# Patient Record
Sex: FEMALE | Race: WHITE | Employment: FULL TIME | URBAN - METROPOLITAN AREA
[De-identification: names, ages, dates, MRNs, and addresses within clinical notes are randomized per-mention and may not be internally consistent; named-entity substitution may affect disease eponyms.]

---

## 2017-01-26 ENCOUNTER — TELEPHONE (OUTPATIENT)
Dept: FAMILY MEDICINE | Facility: CLINIC | Age: 52
End: 2017-01-26

## 2017-01-26 DIAGNOSIS — F41.9 ANXIETY: ICD-10-CM

## 2017-01-26 DIAGNOSIS — F33.1 MAJOR DEPRESSIVE DISORDER, RECURRENT EPISODE, MODERATE (H): Primary | ICD-10-CM

## 2017-01-26 NOTE — TELEPHONE ENCOUNTER
venlafaxine (EFFEXOR-XR) 150 MG 24 hr capsule    Last Written Prescription Date: 11/18/2016  Last Fill Quantity: 30, # refills: 1  Last Office Visit with [facility], [facility] or [facility] prescribing provider: 11/18/2016        BP Readings from Last 3 Encounters:   11/18/16 137/82   09/20/16 128/84   08/10/16 152/87     Pulse: (for Fetzima)  CREATININE   Date Value Ref Range Status   06/26/2015 0.70 0.52 - 1.04 mg/dL Final   ]    Last PHQ-9 score on record=   PHQ-9 SCORE 11/18/2016   Total Score -   Total Score 13       Melany Verdugo MA

## 2017-01-30 RX ORDER — VENLAFAXINE HYDROCHLORIDE 150 MG/1
CAPSULE, EXTENDED RELEASE ORAL
Qty: 7 CAPSULE | Refills: 0 | Status: SHIPPED | OUTPATIENT
Start: 2017-01-30 | End: 2017-02-01

## 2017-01-30 NOTE — TELEPHONE ENCOUNTER
Medication refilled for # 7.  Further refills will be provided at the appointment.    Signed Prescriptions:                        Disp   Refills    venlafaxine (EFFEXOR-XR) 150 MG 24 hr caps*7 caps*0        Sig: Take 1 capsule by mouth daily.  Authorizing Provider: LIZZETTE PINO  Ordering User: AWILDA NEWELL RN, BSN

## 2017-01-30 NOTE — TELEPHONE ENCOUNTER
Reason for call: Patient has scheduled with provider for Wed 2-1-17 @ 11:00 am; Please refill enough until appointment.    Phone Number Pt can be reached at: Home number on file [phone] (home)  Best Time: anytime  Can we leave a detailed message on this number? YES

## 2017-01-30 NOTE — TELEPHONE ENCOUNTER
Routing refill request to provider for review/approval because:  Per the RN's refill protocol if patient's PHQ-9 score over 4 route to the provider to advise on the next step.    Donaldo CASTILLO RN, BSN

## 2017-01-30 NOTE — TELEPHONE ENCOUNTER
Needs visit to address her phq-9  Please call to schedule; can fill enough to last until visit  Karyn Prasad MD

## 2017-02-01 ENCOUNTER — OFFICE VISIT (OUTPATIENT)
Dept: FAMILY MEDICINE | Facility: CLINIC | Age: 52
End: 2017-02-01
Payer: COMMERCIAL

## 2017-02-01 VITALS
OXYGEN SATURATION: 99 % | WEIGHT: 198 LBS | DIASTOLIC BLOOD PRESSURE: 84 MMHG | TEMPERATURE: 98 F | HEART RATE: 67 BPM | BODY MASS INDEX: 33.8 KG/M2 | HEIGHT: 64 IN | SYSTOLIC BLOOD PRESSURE: 132 MMHG

## 2017-02-01 DIAGNOSIS — F41.9 ANXIETY: ICD-10-CM

## 2017-02-01 DIAGNOSIS — F33.1 MAJOR DEPRESSIVE DISORDER, RECURRENT EPISODE, MODERATE (H): Primary | ICD-10-CM

## 2017-02-01 DIAGNOSIS — Z12.11 SCREEN FOR COLON CANCER: ICD-10-CM

## 2017-02-01 DIAGNOSIS — Z11.59 NEED FOR HEPATITIS C SCREENING TEST: ICD-10-CM

## 2017-02-01 PROCEDURE — 99213 OFFICE O/P EST LOW 20 MIN: CPT | Performed by: FAMILY MEDICINE

## 2017-02-01 RX ORDER — VENLAFAXINE HYDROCHLORIDE 150 MG/1
150 CAPSULE, EXTENDED RELEASE ORAL DAILY
Qty: 30 CAPSULE | Refills: 3 | Status: SHIPPED | OUTPATIENT
Start: 2017-02-01 | End: 2017-06-08

## 2017-02-01 ASSESSMENT — ANXIETY QUESTIONNAIRES
5. BEING SO RESTLESS THAT IT IS HARD TO SIT STILL: NOT AT ALL
GAD7 TOTAL SCORE: 10
3. WORRYING TOO MUCH ABOUT DIFFERENT THINGS: MORE THAN HALF THE DAYS
1. FEELING NERVOUS, ANXIOUS, OR ON EDGE: MORE THAN HALF THE DAYS
6. BECOMING EASILY ANNOYED OR IRRITABLE: NOT AT ALL
7. FEELING AFRAID AS IF SOMETHING AWFUL MIGHT HAPPEN: MORE THAN HALF THE DAYS
2. NOT BEING ABLE TO STOP OR CONTROL WORRYING: MORE THAN HALF THE DAYS

## 2017-02-01 ASSESSMENT — PATIENT HEALTH QUESTIONNAIRE - PHQ9: 5. POOR APPETITE OR OVEREATING: MORE THAN HALF THE DAYS

## 2017-02-01 NOTE — MR AVS SNAPSHOT
After Visit Summary   2/1/2017    Toi Mg    MRN: [id number]           Patient Information     Date Of Birth          1965        Visit Information        Provider Department      2/1/2017 11:00 AM Lashonda Sunshine MD [facility]        Today's Diagnoses     Major depressive disorder, recurrent episode, moderate (H)    -  1     Anxiety         Screen for colon cancer         Need for hepatitis C screening test           Care Instructions    [facility]    If you have any questions regarding to your visit please contact your care team:       Team Red:   Clinic Hours Telephone Number   Dr. Josy Ornelas, NP   7am-7pm  Monday - Thursday   7am-5pm  Fridays  [phone]  (Appointment scheduling available 24/7)    Questions about your visit?   Team Line  [phone]   Urgent Care - [place] and [facility] - 11am-9pm Monday-Friday Saturday-Sunday- 9am-5pm   [place] - 5pm-9pm Monday-Friday Saturday-Sunday- 9am-5pm  [phone] - [facility]  [phone] - [place]       What options do I have for visits at the clinic other than the traditional office visit?  To expand how we care for you, many of our providers are utilizing electronic visits (e-visits) and telephone visits, when medically appropriate, for interactions with their patients rather than a visit in the clinic.   We also offer nurse visits for many medical concerns. Just like any other service, we will bill your insurance company for this type of visit based on time spent on the phone with your provider. Not all insurance companies cover these visits. Please check with your medical insurance if this type of visit is covered. You will be responsible for any charges that are not paid by your insurance.      E-visits via [employer]:  generally incur a $35.00 fee.  Telephone visits:  Time spent on the phone: *charged based on time that is spent on  the phone in increments of 10 minutes. Estimated cost:   5-10 mins $30.00   11-20 mins. $59.00   21-30 mins. $85.00     Use GoodChime!hart (secure email communication and access to your chart) to send your primary care provider a message or make an appointment. Ask someone on your Team how to sign up for School[employer]t.  For a Price Quote for your services, please call our [employer] Line at [phone].      As always, Thank you for trusting us with your health care needs!  Willard Chaparro          Follow-ups after your visit        Additional Services     GASTROENTEROLOGY ADULT REF PROCEDURE ONLY       Last Lab Result: CREATININE (mg/dL)       Date                     Value                 06/26/2015               0.70             ----------  Body mass index is 34.52 kg/(m^2).     Needed:  No  Language:  English    Patient will be contacted to schedule procedure.     Please be aware that coverage of these services is subject to the terms and limitations of your health insurance plan.  Call member services at your health plan with any benefit or coverage questions.  Any procedures must be performed at a [place] facility OR coordinated by your clinic's referral office.    Please bring the following with you to your appointment:    (1) Any X-Rays, CTs or MRIs which have been performed.  Contact the facility where they were done to arrange for  prior to your scheduled appointment.    (2) List of current medications   (3) This referral request   (4) Any documents/labs given to you for this referral                  Who to contact     If you have questions or need follow up information about today's clinic visit or your schedule please contact [facility] directly at [phone].  Normal or non-critical lab and imaging results will be communicated to you by MyChart, letter or phone within 4 business days after the clinic has received the results. If you do not hear from us within 7 days,  "please contact the clinic through InfoBasis or phone. If you have a critical or abnormal lab result, we will notify you by phone as soon as possible.  Submit refill requests through InfoBasis or call your pharmacy and they will forward the refill request to us. Please allow 3 business days for your refill to be completed.          Additional Information About Your Visit        [employer] Information     InfoBasis lets you send messages to your doctor, view your test results, renew your prescriptions, schedule appointments and more. To sign up, go to [email or web address] . Click on \"Log in\" on the left side of the screen, which will take you to the Welcome page. Then click on \"Sign up Now\" on the right side of the page.     You will be asked to enter the access code listed below, as well as some personal information. Please follow the directions to create your username and password.     Your access code is: [email or web address]  Expires: 2017 11:29 AM     Your access code will  in 90 days. If you need help or a new code, please call your [place] clinic or [phone].        Care EveryWhere ID     This is your Care EveryWhere ID. This could be used by other organizations to access your [place] medical records  [id number]        Your Vitals Were     Pulse Temperature Height BMI (Body Mass Index) Pulse Oximetry       67 98  F (36.7  C) (Oral) 5' 3.5\" (1.613 m) 34.52 kg/m2 99%        Blood Pressure from Last 3 Encounters:   17 132/84   16 137/82   16 128/84    Weight from Last 3 Encounters:   17 198 lb (89.812 kg)   16 199 lb (90.266 kg)   16 198 lb (89.812 kg)              We Performed the Following     GASTROENTEROLOGY ADULT REF PROCEDURE ONLY          Today's Medication Changes          These changes are accurate as of: 17 11:29 AM.  If you have any questions, ask your nurse or doctor.               These medicines have changed or have updated prescriptions.        " Dose/Directions    venlafaxine 150 MG 24 hr capsule   Commonly known as:  EFFEXOR-XR   This may have changed:  See the new instructions.   Used for:  Major depressive disorder, recurrent episode, moderate (H), Anxiety   Changed by:  Lashonda Sunshine MD        Dose:  150 mg   Take 1 capsule (150 mg) by mouth daily   Quantity:  30 capsule   Refills:  3            Where to get your medicines      These medications were sent to [facility] [address] [address]    Hours:  24-hours Phone:  [phone]    - venlafaxine 150 MG 24 hr capsule             Primary Care Provider Office Phone # Fax #    Lashonda Sunshine [phone]       [address] [address]        Thank you!     Thank you for choosing [facility]  for your care. Our goal is always to provide you with excellent care. Hearing back from our patients is one way we can continue to improve our services. Please take a few minutes to complete the written survey that you may receive in the mail after your visit with us. Thank you!             Your Updated Medication List - Protect others around you: Learn how to safely use, store and throw away your medicines at www.disposemymeds.org.          This list is accurate as of: 2/1/17 11:29 AM.  Always use your most recent med list.                   Brand Name Dispense Instructions for use    clonazePAM 0.5 MG tablet    klonoPIN    40 tablet    Take 0.5-1 tablets (0.25-0.5 mg) by mouth 2 times daily as needed for anxiety       EXCEDRIN PO          ibuprofen 200 MG capsule      Take 800 mg by mouth every 4 hours as needed for fever       SUMAtriptan 50 MG tablet    IMITREX    18 tablet    Take 1 tablet (50 mg) by mouth at onset of headache for migraine May repeat dose in 2 hours.  Do not exceed 200 mg in 24 hours       venlafaxine 150 MG 24 hr capsule    EFFEXOR-XR    30 capsule    Take 1 capsule  (150 mg) by mouth daily       vitamin D 26650 UNIT capsule    ERGOCALCIFEROL    4 capsule    Take 1 capsule (50,000 Units) by mouth once a week

## 2017-02-01 NOTE — PROGRESS NOTES
SUBJECTIVE:                                                    Toi Mg is a 51 year old female who presents to clinic today for the following health issues:        Depression and Anxiety Follow-Up    Status since last visit: same    Other associated symptoms:None    Complicating factors:     Significant life event: no    Current substance abuse: None    Pt says she feels down during winter months    She does not want to increase medicines at Present as she feels she will be better in spring    PHQ-9 SCORE 8/10/2016 2016 2016   Total Score - - -   Total Score 20 8 13     LAURI-7 SCORE 8/10/2016 2016 2016   Total Score - - -   Total Score 13 8 13        PHQ-9  English      PHQ-9   Any Language     GAD7         Amount of exercise or physical activity: None    Problems taking medications regularly: No    Medication side effects: none    Diet: regular (no restrictions)        Problem list and histories reviewed & adjusted, as indicated.  Additional history: as documented    Patient Active Problem List   Diagnosis     Migraines     Back pain     Vitamin B12 deficiency without anemia     Low iron     Status post gastric bypass for obesity     Moderate major depression (H)     Anxiety     Insomnia     Migraine     Hyperlipidemia LDL goal <160     Family history of colon cancer     Hypothyroidism     Non morbid obesity due to excess calories     Family history of colonic polyps     Vitamin D deficiency     Past Surgical History   Procedure Laterality Date     C  delivery only       Gastric bypass  2002       Social History   Substance Use Topics     Smoking status: Never Smoker      Smokeless tobacco: Never Used     Alcohol Use: No     Family History   Problem Relation Age of Onset     HEART DISEASE Mother      chf d age 67 from Throat ca     Respiratory Mother      Arthritis Mother      CANCER Father      d of Prostate ca d age 65     Prostate Cancer Father       CEREBROVASCULAR DISEASE Paternal Grandmother      DIABETES Brother      Asthma Son      C.A.D. Father      cabg age 60     CANCER Mother      Throat         Current Outpatient Prescriptions   Medication Sig Dispense Refill     venlafaxine (EFFEXOR-XR) 150 MG 24 hr capsule Take 1 capsule (150 mg) by mouth daily 30 capsule 3     vitamin D (ERGOCALCIFEROL) 44347 UNIT capsule Take 1 capsule (50,000 Units) by mouth once a week 4 capsule 1     ibuprofen 200 MG capsule Take 800 mg by mouth every 4 hours as needed for fever       Aspirin-Acetaminophen-Caffeine (EXCEDRIN PO)        [DISCONTINUED] venlafaxine (EFFEXOR-XR) 150 MG 24 hr capsule Take 1 capsule by mouth daily. 7 capsule 0     clonazePAM (KLONOPIN) 0.5 MG tablet Take 0.5-1 tablets (0.25-0.5 mg) by mouth 2 times daily as needed for anxiety 40 tablet 0     SUMAtriptan (IMITREX) 50 MG tablet Take 1 tablet (50 mg) by mouth at onset of headache for migraine May repeat dose in 2 hours.  Do not exceed 200 mg in 24 hours 18 tablet 0     Allergies   Allergen Reactions     Gantrisin [Ethanol]      Pyridium [Barbiturates]      Recent Labs   Lab Test  08/10/16   0925  06/26/15   1107  07/02/14   0807  04/11/13   0850  07/26/12   1145   12/03/09   0946  06/01/09   1316   LDL   --   101  104  132*   --    < >  120  146*   HDL   --   69  61  84   --    < >  92  85   TRIG   --   170*  115  72   --    < >  86  111   ALT   --    --    --    --   26   --   20  19   CR   --   0.70   --    --   0.60   --    --    --    GFRESTIMATED   --   88   --    --   >90   --    --    --    GFRESTBLACK   --   >90   GFR Calc     --    --   >90   --    --    --    POTASSIUM   --   4.0   --    --    --    --    --    --    TSH  4.43*  5.61*   --    --    --    < >   --   4.96    < > = values in this interval not displayed.      BP Readings from Last 3 Encounters:   02/01/17 132/84   11/18/16 137/82   09/20/16 128/84    Wt Readings from Last 3 Encounters:   02/01/17 198 lb (89.812 kg)  "  11/18/16 199 lb (90.266 kg)   09/20/16 198 lb (89.812 kg)                  Labs reviewed in EPIC  Problem list, Medication list, Allergies, and Medical/Social/Surgical histories reviewed in Psychiatric and updated as appropriate.    ROS:  Rest of the ROS is Negative except see above and Problem list [stable]      OBJECTIVE:                                                    /84 mmHg  Pulse 67  Temp(Src) 98  F (36.7  C) (Oral)  Ht 5' 3.5\" (1.613 m)  Wt 198 lb (89.812 kg)  BMI 34.52 kg/m2  SpO2 99%  Body mass index is 34.52 kg/(m^2).  GENERAL: healthy, alert and no distress  NECK: no adenopathy, no asymmetry, masses, or scars and thyroid normal to palpation  RESP: lungs clear to auscultation - no rales, rhonchi or wheezes  CV: regular rate and rhythm, normal S1 S2, no S3 or S4, no murmur, click or rub, no peripheral edema and peripheral pulses strong  ABDOMEN: soft, nontender, no hepatosplenomegaly, no masses and bowel sounds normal  MS: no gross musculoskeletal defects noted, no edema    Diagnostic Test Results:  none      ASSESSMENT/PLAN:                                                        1. Major depressive disorder, recurrent episode, moderate (H)  Refilled meds  Follow up 3 months  - venlafaxine (EFFEXOR-XR) 150 MG 24 hr capsule; Take 1 capsule (150 mg) by mouth daily  Dispense: 30 capsule; Refill: 3    2. Anxiety  As above  - venlafaxine (EFFEXOR-XR) 150 MG 24 hr capsule; Take 1 capsule (150 mg) by mouth daily  Dispense: 30 capsule; Refill: 3    3. Screen for colon cancer  Advised   - GASTROENTEROLOGY ADULT REF PROCEDURE ONLY    4. Need for hepatitis C screening test  Will check with next lab draw      Lashonda Sunshine MD  [facility]    "

## 2017-02-01 NOTE — NURSING NOTE
"Chief Complaint   Patient presents with     Depression     Anxiety       Initial /84 mmHg  Pulse 67  Temp(Src) 98  F (36.7  C) (Oral)  Ht 5' 3.5\" (1.613 m)  Wt 198 lb (89.812 kg)  BMI 34.52 kg/m2  SpO2 99% Estimated body mass index is 34.52 kg/(m^2) as calculated from the following:    Height as of this encounter: 5' 3.5\" (1.613 m).    Weight as of this encounter: 198 lb (89.812 kg).  BP completed using cuff size: large left arm    Jennifer Santo MA      "

## 2017-02-01 NOTE — PATIENT INSTRUCTIONS
[facility]    If you have any questions regarding to your visit please contact your care team:       Team Red:   Clinic Hours Telephone Number   Dr. Josy Ornelas, NP   7am-7pm  Monday - Thursday   7am-5pm  Fridays  [phone]  (Appointment scheduling available 24/7)    Questions about your visit?   Team Line  [phone]   Urgent Care - [place] - 11am-9pm Monday-Friday Saturday-Sunday- 9am-5pm   [place] - 5pm-9pm Monday-Friday Saturday-Sunday- 9am-5pm  [phone] - [place]   [phone] - [place]       What options do I have for visits at the clinic other than the traditional office visit?  To expand how we care for you, many of our providers are utilizing electronic visits (e-visits) and telephone visits, when medically appropriate, for interactions with their patients rather than a visit in the clinic.   We also offer nurse visits for many medical concerns. Just like any other service, we will bill your insurance company for this type of visit based on time spent on the phone with your provider. Not all insurance companies cover these visits. Please check with your medical insurance if this type of visit is covered. You will be responsible for any charges that are not paid by your insurance.      E-visits via [employer]:  generally incur a $35.00 fee.  Telephone visits:  Time spent on the phone: *charged based on time that is spent on the phone in increments of 10 minutes. Estimated cost:   5-10 mins $30.00   11-20 mins. $59.00   21-30 mins. $85.00     Use Recommendot (secure email communication and access to your chart) to send your primary care provider a message or make an appointment. Ask someone on your Team how to sign up for [employer].  For a Price Quote for your services, please call our Consumer Price Line at [phone].      As always, Thank you for trusting us with your health care needs!  Willard BRIDGES  [place]

## 2017-02-02 ASSESSMENT — PATIENT HEALTH QUESTIONNAIRE - PHQ9: SUM OF ALL RESPONSES TO PHQ QUESTIONS 1-9: 9

## 2017-02-02 ASSESSMENT — ANXIETY QUESTIONNAIRES: GAD7 TOTAL SCORE: 10

## 2017-04-11 ENCOUNTER — TRANSFERRED RECORDS (OUTPATIENT)
Dept: HEALTH INFORMATION MANAGEMENT | Facility: CLINIC | Age: 52
End: 2017-04-11

## 2017-06-08 DIAGNOSIS — F41.9 ANXIETY: ICD-10-CM

## 2017-06-08 DIAGNOSIS — F33.1 MAJOR DEPRESSIVE DISORDER, RECURRENT EPISODE, MODERATE (H): ICD-10-CM

## 2017-06-08 NOTE — TELEPHONE ENCOUNTER
Venlafaxine 150 mg 24 hour capsule    Last Written Prescription Date: 2/1/2017  Last Fill Quantity: 30, # refills: 3  Last Office Visit with [facility], [facility] or [facility] prescribing provider: 2/1/2017   Next 5 appointments (look out 90 days)     Jun 13, 2017 10:50 AM CDT   [facility]   [facility]    [address]  [place] [address]   [phone]                   BP Readings from Last 3 Encounters:   02/01/17 132/84   11/18/16 137/82   09/20/16 128/84     Pulse: (for Fetzima)  Creatinine   Date Value Ref Range Status   06/26/2015 0.70 0.52 - 1.04 mg/dL Final   ]    Last PHQ-9 score on record=   PHQ-9 SCORE 2/1/2017   Total Score 9

## 2017-06-09 RX ORDER — VENLAFAXINE HYDROCHLORIDE 150 MG/1
CAPSULE, EXTENDED RELEASE ORAL
Qty: 30 CAPSULE | Refills: 0 | Status: SHIPPED | OUTPATIENT
Start: 2017-06-09 | End: 2017-07-19

## 2017-06-09 NOTE — TELEPHONE ENCOUNTER
Medication is being filled for 1 time refill only due to:  Patient needs to be seen because needs recheck.   Has upcoming appt.  Patti Strauss RN

## 2017-06-13 ENCOUNTER — OFFICE VISIT (OUTPATIENT)
Dept: FAMILY MEDICINE | Facility: CLINIC | Age: 52
End: 2017-06-13
Payer: COMMERCIAL

## 2017-06-13 VITALS
HEART RATE: 70 BPM | DIASTOLIC BLOOD PRESSURE: 80 MMHG | HEIGHT: 64 IN | WEIGHT: 193 LBS | BODY MASS INDEX: 32.95 KG/M2 | SYSTOLIC BLOOD PRESSURE: 122 MMHG | TEMPERATURE: 99 F

## 2017-06-13 DIAGNOSIS — Z12.4 SCREENING FOR MALIGNANT NEOPLASM OF CERVIX: ICD-10-CM

## 2017-06-13 DIAGNOSIS — F41.9 ANXIETY: ICD-10-CM

## 2017-06-13 DIAGNOSIS — Z11.59 NEED FOR HEPATITIS C SCREENING TEST: ICD-10-CM

## 2017-06-13 DIAGNOSIS — Z12.11 SCREEN FOR COLON CANCER: ICD-10-CM

## 2017-06-13 DIAGNOSIS — F33.1 MAJOR DEPRESSIVE DISORDER, RECURRENT EPISODE, MODERATE (H): ICD-10-CM

## 2017-06-13 DIAGNOSIS — Z12.31 VISIT FOR SCREENING MAMMOGRAM: ICD-10-CM

## 2017-06-13 PROCEDURE — 99213 OFFICE O/P EST LOW 20 MIN: CPT | Performed by: FAMILY MEDICINE

## 2017-06-13 RX ORDER — VENLAFAXINE HYDROCHLORIDE 75 MG/1
225 CAPSULE, EXTENDED RELEASE ORAL DAILY
Qty: 30 CAPSULE | Refills: 1 | Status: SHIPPED | OUTPATIENT
Start: 2017-06-13 | End: 2017-07-19

## 2017-06-13 RX ORDER — VENLAFAXINE HYDROCHLORIDE 150 MG/1
CAPSULE, EXTENDED RELEASE ORAL
Qty: 30 CAPSULE | Refills: 0 | Status: CANCELLED | OUTPATIENT
Start: 2017-06-13

## 2017-06-13 ASSESSMENT — ANXIETY QUESTIONNAIRES
7. FEELING AFRAID AS IF SOMETHING AWFUL MIGHT HAPPEN: MORE THAN HALF THE DAYS
1. FEELING NERVOUS, ANXIOUS, OR ON EDGE: NEARLY EVERY DAY
IF YOU CHECKED OFF ANY PROBLEMS ON THIS QUESTIONNAIRE, HOW DIFFICULT HAVE THESE PROBLEMS MADE IT FOR YOU TO DO YOUR WORK, TAKE CARE OF THINGS AT HOME, OR GET ALONG WITH OTHER PEOPLE: SOMEWHAT DIFFICULT
GAD7 TOTAL SCORE: 10
6. BECOMING EASILY ANNOYED OR IRRITABLE: NOT AT ALL
3. WORRYING TOO MUCH ABOUT DIFFERENT THINGS: MORE THAN HALF THE DAYS
5. BEING SO RESTLESS THAT IT IS HARD TO SIT STILL: NOT AT ALL
2. NOT BEING ABLE TO STOP OR CONTROL WORRYING: MORE THAN HALF THE DAYS

## 2017-06-13 ASSESSMENT — PATIENT HEALTH QUESTIONNAIRE - PHQ9: 5. POOR APPETITE OR OVEREATING: SEVERAL DAYS

## 2017-06-13 NOTE — MR AVS SNAPSHOT
After Visit Summary   6/13/2017    Toi Mg    MRN: [id number]           Patient Information     Date Of Birth          1965        Visit Information        Provider Department      6/13/2017 10:50 AM Lashonda Sunshine MD [facility]        Today's Diagnoses     Major depressive disorder, recurrent episode, moderate (H)        Anxiety        Screen for colon cancer        Visit for screening mammogram        Screening for malignant neoplasm of cervix        Need for hepatitis C screening test          Care Instructions    [facility]    If you have any questions regarding to your visit please contact your care team:       Team Red:   Clinic Hours Telephone Number   Dr. Josy Villaseñor  (pediatrics)  Courtney Ornelas NP 7am-7pm  Monday - Thursday   7am-5pm  Fridays  [phone] (fax)    Donaldo YUEN  [phone]   Urgent Care - [place] and [place] Monday-Friday  [place] - 11am-8pm  Saturday-Sunday  Both sites - 9am-5pm  [phone] - [facility]  [phone] - [place]       What options do I have for visits at the clinic other than the traditional office visit?  To expand how we care for you, many of our providers are utilizing electronic visits (e-visits) and telephone visits, when medically appropriate, for interactions with their patients rather than a visit in the clinic.   We also offer nurse visits for many medical concerns. Just like any other service, we will bill your insurance company for this type of visit based on time spent on the phone with your provider. Not all insurance companies cover these visits. Please check with your medical insurance if this type of visit is covered. You will be responsible for any charges that are not paid by your insurance.      E-visits via [employer]:  generally incur a $35.00 fee.  Telephone visits:  Time spent on the phone: *charged based on time that is spent on  the phone in increments of 10 minutes. Estimated cost:   5-10 mins $30.00   11-20 mins. $59.00   21-30 mins. $85.00     As always, Thank you for trusting us with your health care needs!            Discharged by Jennifer Santo MA.            Follow-ups after your visit        Additional Services     GASTROENTEROLOGY ADULT REF PROCEDURE ONLY       Last Lab Result: Creatinine (mg/dL)       Date                     Value                 06/26/2015               0.70             ----------  Body mass index is 33.65 kg/(m^2).     Needed:  No  Language:  English    Patient will be contacted to schedule procedure.     Please be aware that coverage of these services is subject to the terms and limitations of your health insurance plan.  Call member services at your health plan with any benefit or coverage questions.  Any procedures must be performed at a [facility] facility OR coordinated by your clinic's referral office.    Please bring the following with you to your appointment:    (1) Any X-Rays, CTs or MRIs which have been performed.  Contact the facility where they were done to arrange for  prior to your scheduled appointment.    (2) List of current medications   (3) This referral request   (4) Any documents/labs given to you for this referral                  Future tests that were ordered for you today     Open Future Orders        Priority Expected Expires Ordered    MA SCREENING DIGITAL BILAT - Future  (s+30) Routine  6/13/2018 6/13/2017            Who to contact     If you have questions or need follow up information about today's clinic visit or your schedule please contact [facility]\"" directly at [phone].  Normal or non-critical lab and imaging results will be communicated to you by MyChart, letter or phone within 4 business days after the clinic has received the results. If you do not hear from us within 7 days, please contact the clinic through MyChart or phone. If you have a  "critical or abnormal lab result, we will notify you by phone as soon as possible.  Submit refill requests through [employer] or call your pharmacy and they will forward the refill request to us. Please allow 3 business days for your refill to be completed.          Additional Information About Your Visit        [employer] Information     [employer] lets you send messages to your doctor, view your test results, renew your prescriptions, schedule appointments and more. To sign up, go to [email or web address] . Click on \"Log in\" on the left side of the screen, which will take you to the Welcome page. Then click on \"Sign up Now\" on the right side of the page.     You will be asked to enter the access code listed below, as well as some personal information. Please follow the directions to create your username and password.     Your access code is: [email or web address]  Expires: 2017 11:18 AM     Your access code will  in 90 days. If you need help or a new code, please call your [place] clinic or [phone].        Care EveryWhere ID     This is your Care EveryWhere ID. This could be used by other organizations to access your [place] medical records  [id number]        Your Vitals Were     Pulse Temperature Height BMI (Body Mass Index)          70 99  F (37.2  C) 5' 3.5\" (1.613 m) 33.65 kg/m2         Blood Pressure from Last 3 Encounters:   17 122/80   17 132/84   16 137/82    Weight from Last 3 Encounters:   17 193 lb (87.5 kg)   17 198 lb (89.8 kg)   16 199 lb (90.3 kg)              We Performed the Following     GASTROENTEROLOGY ADULT REF PROCEDURE ONLY          Today's Medication Changes          These changes are accurate as of: 17 11:18 AM.  If you have any questions, ask your nurse or doctor.               These medicines have changed or have updated prescriptions.        Dose/Directions    * venlafaxine 150 MG 24 hr capsule   Commonly known as:  EFFEXOR-XR   This may have " changed:  Another medication with the same name was added. Make sure you understand how and when to take each.   Used for:  Major depressive disorder, recurrent episode, moderate (H), Anxiety   Changed by:  Lashonda Sunshine MD        Take 1 capsule by mouth once daily.   Quantity:  30 capsule   Refills:  0       * venlafaxine 75 MG 24 hr capsule   Commonly known as:  EFFEXOR-XR   This may have changed:  You were already taking a medication with the same name, and this prescription was added. Make sure you understand how and when to take each.   Used for:  Major depressive disorder, recurrent episode, moderate (H), Anxiety   Changed by:  Lashonda Sunshine MD        Dose:  225 mg   Take 3 capsules (225 mg) by mouth daily   Quantity:  30 capsule   Refills:  1       * Notice:  This list has 2 medication(s) that are the same as other medications prescribed for you. Read the directions carefully, and ask your doctor or other care provider to review them with you.         Where to get your medicines      These medications were sent to [facility] [place] [address]     Phone:  [phone]     venlafaxine 75 MG 24 hr capsule                Primary Care Provider Office Phone # Fax #    Lashonda Sunshine [phone]       [address] [address]        Thank you!     Thank you for choosing [facility]  for your care. Our goal is always to provide you with excellent care. Hearing back from our patients is one way we can continue to improve our services. Please take a few minutes to complete the written survey that you may receive in the mail after your visit with us. Thank you!             Your Updated Medication List - Protect others around you: Learn how to safely use, store and throw away your medicines at www.disposemymeds.org.          This list is accurate as of: 6/13/17 11:18 AM.  Always use  your most recent med list.                   Brand Name Dispense Instructions for use    EXCEDRIN PO          ibuprofen 200 MG capsule      Take 800 mg by mouth every 4 hours as needed for fever       SUMAtriptan 50 MG tablet    IMITREX    18 tablet    Take 1 tablet (50 mg) by mouth at onset of headache for migraine May repeat dose in 2 hours.  Do not exceed 200 mg in 24 hours       * venlafaxine 150 MG 24 hr capsule    EFFEXOR-XR    30 capsule    Take 1 capsule by mouth once daily.       * venlafaxine 75 MG 24 hr capsule    EFFEXOR-XR    30 capsule    Take 3 capsules (225 mg) by mouth daily       * Notice:  This list has 2 medication(s) that are the same as other medications prescribed for you. Read the directions carefully, and ask your doctor or other care provider to review them with you.

## 2017-06-13 NOTE — PROGRESS NOTES
SUBJECTIVE:                                                    Toi Mg is a 52 year old female who presents to clinic today for the following health issues:      Chief Complaint   Patient presents with     Recheck Medication           Depression and Anxiety Follow-Up    Status since last visit: Gt    Other associated symptoms:None    Complicating factors:stress at work    Kid has CP     Significant life event: No     Current substance abuse: None    PHQ-9 SCORE 2016   Total Score - - -   Total Score 8 13 9     LAURI-7 SCORE 2016   Total Score - - -   Total Score 8 13 10        PHQ-9  English      PHQ-9   Any Language     GAD7       Problem list and histories reviewed & adjusted, as indicated.  Additional history: as documented    Patient Active Problem List   Diagnosis     Migraines     Back pain     Vitamin B12 deficiency without anemia     Low iron     Status post gastric bypass for obesity     Moderate major depression (H)     Anxiety     Insomnia     Migraine     Hyperlipidemia LDL goal <160     Family history of colon cancer     Hypothyroidism     Non morbid obesity due to excess calories     Family history of colonic polyps     Vitamin D deficiency     Past Surgical History:   Procedure Laterality Date     C  DELIVERY ONLY       GASTRIC BYPASS  2002       Social History   Substance Use Topics     Smoking status: Never Smoker     Smokeless tobacco: Never Used     Alcohol use No     Family History   Problem Relation Age of Onset     HEART DISEASE Mother      chf d age 67 from Throat ca     Respiratory Mother      Arthritis Mother      CANCER Mother      Throat     CANCER Father      d of Prostate ca d age 65     Prostate Cancer Father      C.A.D. Father      cabg age 60     CEREBROVASCULAR DISEASE Paternal Grandmother      DIABETES Brother      Asthma Son          Current Outpatient Prescriptions   Medication Sig Dispense Refill      "venlafaxine (EFFEXOR-XR) 75 MG 24 hr capsule Take 3 capsules (225 mg) by mouth daily 30 capsule 1     venlafaxine (EFFEXOR-XR) 150 MG 24 hr capsule Take 1 capsule by mouth once daily. 30 capsule 0     ibuprofen 200 MG capsule Take 800 mg by mouth every 4 hours as needed for fever       Aspirin-Acetaminophen-Caffeine (EXCEDRIN PO)        SUMAtriptan (IMITREX) 50 MG tablet Take 1 tablet (50 mg) by mouth at onset of headache for migraine May repeat dose in 2 hours.  Do not exceed 200 mg in 24 hours 18 tablet 0     Allergies   Allergen Reactions     Gantrisin [Ethanol]      Pyridium [Barbiturates]      Recent Labs   Lab Test  08/10/16   0925  06/26/15   1107  07/02/14   0807  04/11/13   0850  07/26/12   1145   12/03/09   0946  06/01/09   1316   LDL   --   101  104  132*   --    < >  120  146*   HDL   --   69  61  84   --    < >  92  85   TRIG   --   170*  115  72   --    < >  86  111   ALT   --    --    --    --   26   --   20  19   CR   --   0.70   --    --   0.60   --    --    --    GFRESTIMATED   --   88   --    --   >90   --    --    --    GFRESTBLACK   --   >90   GFR Calc     --    --   >90   --    --    --    POTASSIUM   --   4.0   --    --    --    --    --    --    TSH  4.43*  5.61*   --    --    --    < >   --   4.96    < > = values in this interval not displayed.      BP Readings from Last 3 Encounters:   06/13/17 122/80   02/01/17 132/84   11/18/16 137/82    Wt Readings from Last 3 Encounters:   06/13/17 193 lb (87.5 kg)   02/01/17 198 lb (89.8 kg)   11/18/16 199 lb (90.3 kg)                  Labs reviewed in EPIC    Reviewed and updated as needed this visit by clinical staff  Tobacco  Allergies  Meds  Problems       Reviewed and updated as needed this visit by Provider  Problems         ROS:  Rest of the ROS is Negative except see above and Problem list [stable]      OBJECTIVE:                                                    /80  Pulse 70  Temp 99  F (37.2  C)  Ht 5' 3.5\" " (1.613 m)  Wt 193 lb (87.5 kg)  BMI 33.65 kg/m2  Body mass index is 33.65 kg/(m^2).  GENERAL: healthy, alert and no distress  PSYCH: mentation appears normal, affect normal/bright    Diagnostic Test Results:  none      ASSESSMENT/PLAN:                                                        1. Major depressive disorder, recurrent episode, moderate (H)  controlled  - venlafaxine (EFFEXOR-XR) 75 MG 24 hr capsule; Take 3 capsules (225 mg) by mouth daily  Dispense: 30 capsule; Refill: 1    2. Anxiety  controlled  - venlafaxine (EFFEXOR-XR) 75 MG 24 hr capsule; Take 3 capsules (225 mg) by mouth daily  Dispense: 30 capsule; Refill: 1    3. Screen for colon cancer  Advised   - GASTROENTEROLOGY ADULT REF PROCEDURE ONLY    4. Visit for screening mammogram  Advised   - MA SCREENING DIGITAL BILAT - Future  (s+30); Future    5. Screening for malignant neoplasm of cervix  Advised     6. Need for hepatitis C screening test  Pt will come for Physical and mammogram  Follow up 1 month    Lashonda Sunshine MD  [facility]

## 2017-06-13 NOTE — PATIENT INSTRUCTIONS
[facility]    If you have any questions regarding to your visit please contact your care team:       Team Red:   Clinic Hours Telephone Number   Dr. Josy Villaseñor  (pediatrics)  Courtney Ornelas NP 7am-7pm  Monday - Thursday   7am-5pm  Fridays  [phone] (fax)    Donaldo YUEN  [phone]   Urgent Care - [place] and [place] Monday-Friday  [place] - 11am-8pm  Saturday-Sunday  Both sites - 9am-5pm  [phone] - [facility]  [phone] - [place]       What options do I have for visits at the clinic other than the traditional office visit?  To expand how we care for you, many of our providers are utilizing electronic visits (e-visits) and telephone visits, when medically appropriate, for interactions with their patients rather than a visit in the clinic.   We also offer nurse visits for many medical concerns. Just like any other service, we will bill your insurance company for this type of visit based on time spent on the phone with your provider. Not all insurance companies cover these visits. Please check with your medical insurance if this type of visit is covered. You will be responsible for any charges that are not paid by your insurance.      E-visits via [employer]:  generally incur a $35.00 fee.  Telephone visits:  Time spent on the phone: *charged based on time that is spent on the phone in increments of 10 minutes. Estimated cost:   5-10 mins $30.00   11-20 mins. $59.00   21-30 mins. $85.00     As always, Thank you for trusting us with your health care needs!            Discharged by Jennifer Santo MA.

## 2017-06-13 NOTE — NURSING NOTE
"Chief Complaint   Patient presents with     Recheck Medication       Initial /80  Pulse 70  Temp 99  F (37.2  C)  Ht 5' 3.5\" (1.613 m)  Wt 193 lb (87.5 kg)  BMI 33.65 kg/m2 Estimated body mass index is 33.65 kg/(m^2) as calculated from the following:    Height as of this encounter: 5' 3.5\" (1.613 m).    Weight as of this encounter: 193 lb (87.5 kg).  Medication Reconciliation: complete     Naz Blake. MA      "

## 2017-06-14 ASSESSMENT — PATIENT HEALTH QUESTIONNAIRE - PHQ9: SUM OF ALL RESPONSES TO PHQ QUESTIONS 1-9: 7

## 2017-06-14 ASSESSMENT — ANXIETY QUESTIONNAIRES: GAD7 TOTAL SCORE: 10

## 2017-06-21 ENCOUNTER — HOSPITAL ENCOUNTER (OUTPATIENT)
Facility: AMBULATORY SURGERY CENTER | Age: 52
End: 2017-06-21
Attending: SURGERY | Admitting: SURGERY
Payer: COMMERCIAL

## 2017-07-07 ENCOUNTER — RADIANT APPOINTMENT (OUTPATIENT)
Dept: MAMMOGRAPHY | Facility: CLINIC | Age: 52
End: 2017-07-07
Attending: FAMILY MEDICINE
Payer: COMMERCIAL

## 2017-07-07 DIAGNOSIS — Z12.31 VISIT FOR SCREENING MAMMOGRAM: ICD-10-CM

## 2017-07-07 PROCEDURE — G0202 SCR MAMMO BI INCL CAD: HCPCS | Mod: TC

## 2017-07-18 ENCOUNTER — TELEPHONE (OUTPATIENT)
Dept: FAMILY MEDICINE | Facility: CLINIC | Age: 52
End: 2017-07-18

## 2017-07-18 DIAGNOSIS — F33.1 MAJOR DEPRESSIVE DISORDER, RECURRENT EPISODE, MODERATE (H): ICD-10-CM

## 2017-07-18 DIAGNOSIS — F41.9 ANXIETY: ICD-10-CM

## 2017-07-19 RX ORDER — VENLAFAXINE HYDROCHLORIDE 150 MG/1
150 CAPSULE, EXTENDED RELEASE ORAL DAILY
Qty: 30 CAPSULE | Refills: 3 | Status: SHIPPED | OUTPATIENT
Start: 2017-07-19

## 2017-07-19 RX ORDER — VENLAFAXINE HYDROCHLORIDE 75 MG/1
225 CAPSULE, EXTENDED RELEASE ORAL DAILY
Qty: 30 CAPSULE | Refills: 1 | Status: CANCELLED | OUTPATIENT
Start: 2017-07-19

## 2017-07-20 ASSESSMENT — PATIENT HEALTH QUESTIONNAIRE - PHQ9: SUM OF ALL RESPONSES TO PHQ QUESTIONS 1-9: 4

## 2017-07-25 DIAGNOSIS — G43.909 MIGRAINE WITHOUT STATUS MIGRAINOSUS, NOT INTRACTABLE, UNSPECIFIED MIGRAINE TYPE: ICD-10-CM

## 2017-07-25 RX ORDER — SUMATRIPTAN 50 MG/1
50 TABLET, FILM COATED ORAL
Qty: 18 TABLET | Refills: 1 | Status: SHIPPED | OUTPATIENT
Start: 2017-07-25 | End: 2018-02-22

## 2017-07-25 NOTE — TELEPHONE ENCOUNTER
Prescription approved per [facility] [facility].    Signed Prescriptions:                        Disp   Refills    SUMAtriptan (IMITREX) 50 MG tablet         18 tab*1        Sig: Take 1 tablet (50 mg) by mouth at onset of headache           for migraine May repeat dose in 2 hours.  Do not           exceed 200 mg in 24 hours  Authorizing Provider: DAVIN SHELTON  Ordering User: AWILDA NEWELL, RN, BSN

## 2017-09-15 ENCOUNTER — OFFICE VISIT (OUTPATIENT)
Dept: FAMILY MEDICINE | Facility: CLINIC | Age: 52
End: 2017-09-15
Payer: COMMERCIAL

## 2017-09-15 VITALS
HEART RATE: 70 BPM | WEIGHT: 196.2 LBS | SYSTOLIC BLOOD PRESSURE: 130 MMHG | HEIGHT: 64 IN | OXYGEN SATURATION: 97 % | DIASTOLIC BLOOD PRESSURE: 86 MMHG | BODY MASS INDEX: 33.49 KG/M2 | RESPIRATION RATE: 15 BRPM | TEMPERATURE: 98.4 F

## 2017-09-15 DIAGNOSIS — E03.9 ACQUIRED HYPOTHYROIDISM: ICD-10-CM

## 2017-09-15 DIAGNOSIS — F32.1 MODERATE MAJOR DEPRESSION (H): ICD-10-CM

## 2017-09-15 DIAGNOSIS — E61.1 LOW IRON: ICD-10-CM

## 2017-09-15 DIAGNOSIS — E53.8 VITAMIN B12 DEFICIENCY WITHOUT ANEMIA: ICD-10-CM

## 2017-09-15 DIAGNOSIS — Z11.59 NEED FOR HEPATITIS C SCREENING TEST: ICD-10-CM

## 2017-09-15 DIAGNOSIS — Z00.01 ENCOUNTER FOR ROUTINE ADULT PHYSICAL EXAM WITH ABNORMAL FINDINGS: Primary | ICD-10-CM

## 2017-09-15 DIAGNOSIS — Z12.4 SCREENING FOR MALIGNANT NEOPLASM OF CERVIX: ICD-10-CM

## 2017-09-15 DIAGNOSIS — Z12.11 SCREEN FOR COLON CANCER: ICD-10-CM

## 2017-09-15 DIAGNOSIS — E78.5 HYPERLIPIDEMIA LDL GOAL <160: ICD-10-CM

## 2017-09-15 DIAGNOSIS — Z02.9 ADMINISTRATIVE ENCOUNTER: ICD-10-CM

## 2017-09-15 DIAGNOSIS — Z23 NEED FOR PROPHYLACTIC VACCINATION AND INOCULATION AGAINST INFLUENZA: ICD-10-CM

## 2017-09-15 DIAGNOSIS — Z80.0 FAMILY HISTORY OF COLON CANCER: ICD-10-CM

## 2017-09-15 DIAGNOSIS — G43.809 OTHER MIGRAINE WITHOUT STATUS MIGRAINOSUS, NOT INTRACTABLE: ICD-10-CM

## 2017-09-15 DIAGNOSIS — F41.9 ANXIETY: ICD-10-CM

## 2017-09-15 LAB
CHOLEST SERPL-MCNC: 246 MG/DL
DEPRECATED CALCIDIOL+CALCIFEROL SERPL-MC: 25 UG/L (ref 20–75)
GLUCOSE SERPL-MCNC: 97 MG/DL (ref 70–99)
HCV AB SERPL QL IA: NONREACTIVE
HDLC SERPL-MCNC: 95 MG/DL
IRON SATN MFR SERPL: 21 % (ref 15–46)
IRON SERPL-MCNC: 103 UG/DL (ref 35–180)
LDLC SERPL CALC-MCNC: 128 MG/DL
NONHDLC SERPL-MCNC: 151 MG/DL
T4 FREE SERPL-MCNC: 1.06 NG/DL (ref 0.76–1.46)
TIBC SERPL-MCNC: 486 UG/DL (ref 240–430)
TRIGL SERPL-MCNC: 114 MG/DL
TSH SERPL DL<=0.005 MIU/L-ACNC: 6.29 MU/L (ref 0.4–4)
VIT B12 SERPL-MCNC: 335 PG/ML (ref 193–986)

## 2017-09-15 PROCEDURE — 83550 IRON BINDING TEST: CPT | Performed by: FAMILY MEDICINE

## 2017-09-15 PROCEDURE — 86803 HEPATITIS C AB TEST: CPT | Performed by: FAMILY MEDICINE

## 2017-09-15 PROCEDURE — 83540 ASSAY OF IRON: CPT | Performed by: FAMILY MEDICINE

## 2017-09-15 PROCEDURE — 82947 ASSAY GLUCOSE BLOOD QUANT: CPT | Performed by: FAMILY MEDICINE

## 2017-09-15 PROCEDURE — 99213 OFFICE O/P EST LOW 20 MIN: CPT | Mod: 25 | Performed by: FAMILY MEDICINE

## 2017-09-15 PROCEDURE — 84443 ASSAY THYROID STIM HORMONE: CPT | Performed by: FAMILY MEDICINE

## 2017-09-15 PROCEDURE — G0145 SCR C/V CYTO,THINLAYER,RESCR: HCPCS | Performed by: FAMILY MEDICINE

## 2017-09-15 PROCEDURE — 84439 ASSAY OF FREE THYROXINE: CPT | Performed by: FAMILY MEDICINE

## 2017-09-15 PROCEDURE — 36415 COLL VENOUS BLD VENIPUNCTURE: CPT | Performed by: FAMILY MEDICINE

## 2017-09-15 PROCEDURE — 82306 VITAMIN D 25 HYDROXY: CPT | Performed by: FAMILY MEDICINE

## 2017-09-15 PROCEDURE — 80061 LIPID PANEL: CPT | Performed by: FAMILY MEDICINE

## 2017-09-15 PROCEDURE — 82607 VITAMIN B-12: CPT | Performed by: FAMILY MEDICINE

## 2017-09-15 PROCEDURE — 99396 PREV VISIT EST AGE 40-64: CPT | Performed by: FAMILY MEDICINE

## 2017-09-15 PROCEDURE — 87624 HPV HI-RISK TYP POOLED RSLT: CPT | Performed by: FAMILY MEDICINE

## 2017-09-15 RX ORDER — CLONAZEPAM 0.5 MG/1
0.25-0.5 TABLET ORAL 2 TIMES DAILY PRN
Qty: 20 TABLET | Refills: 0 | Status: SHIPPED | OUTPATIENT
Start: 2017-09-15 | End: 2018-02-22

## 2017-09-15 RX ORDER — MULTIPLE VITAMINS W/ MINERALS TAB 9MG-400MCG
1 TAB ORAL DAILY
COMMUNITY

## 2017-09-15 ASSESSMENT — ANXIETY QUESTIONNAIRES
7. FEELING AFRAID AS IF SOMETHING AWFUL MIGHT HAPPEN: MORE THAN HALF THE DAYS
3. WORRYING TOO MUCH ABOUT DIFFERENT THINGS: NEARLY EVERY DAY
1. FEELING NERVOUS, ANXIOUS, OR ON EDGE: NEARLY EVERY DAY
5. BEING SO RESTLESS THAT IT IS HARD TO SIT STILL: MORE THAN HALF THE DAYS
6. BECOMING EASILY ANNOYED OR IRRITABLE: NOT AT ALL
2. NOT BEING ABLE TO STOP OR CONTROL WORRYING: NEARLY EVERY DAY
IF YOU CHECKED OFF ANY PROBLEMS ON THIS QUESTIONNAIRE, HOW DIFFICULT HAVE THESE PROBLEMS MADE IT FOR YOU TO DO YOUR WORK, TAKE CARE OF THINGS AT HOME, OR GET ALONG WITH OTHER PEOPLE: VERY DIFFICULT
GAD7 TOTAL SCORE: 15

## 2017-09-15 ASSESSMENT — PATIENT HEALTH QUESTIONNAIRE - PHQ9
5. POOR APPETITE OR OVEREATING: MORE THAN HALF THE DAYS
SUM OF ALL RESPONSES TO PHQ QUESTIONS 1-9: 14

## 2017-09-15 NOTE — NURSING NOTE
"Chief Complaint   Patient presents with     Physical       Initial /86 (BP Location: Left arm, Patient Position: Chair, Cuff Size: Adult Regular)  Pulse 70  Temp 98.4  F (36.9  C) (Oral)  Resp 15  Ht 5' 3.5\" (1.613 m)  Wt 196 lb 3.2 oz (89 kg)  SpO2 97%  Breastfeeding? No  BMI 34.21 kg/m2 Estimated body mass index is 34.21 kg/(m^2) as calculated from the following:    Height as of this encounter: 5' 3.5\" (1.613 m).    Weight as of this encounter: 196 lb 3.2 oz (89 kg).  Medication Reconciliation: complete     Willard Chaparro      "

## 2017-09-15 NOTE — PATIENT INSTRUCTIONS
[facility]    If you have any questions regarding to your visit please contact your care team:       Team Red:   Clinic Hours Telephone Number   Dr. Josy Villaseñor  (pediatrics)  Courtney Ornelas NP 7am-7pm  Monday - Thursday   7am-5pm  Fridays  [phone] (fax)    Donaldo YUEN  [phone]   Urgent Care - [place] and [place] Monday-Friday  [place] - 11am-8pm  Saturday-Sunday  Both sites - 9am-5pm  [phone] - [facility]  [phone] - [place]       What options do I have for visits at the clinic other than the traditional office visit?  To expand how we care for you, many of our providers are utilizing electronic visits (e-visits) and telephone visits, when medically appropriate, for interactions with their patients rather than a visit in the clinic.   We also offer nurse visits for many medical concerns. Just like any other service, we will bill your insurance company for this type of visit based on time spent on the phone with your provider. Not all insurance companies cover these visits. Please check with your medical insurance if this type of visit is covered. You will be responsible for any charges that are not paid by your insurance.      E-visits via [employer]:  generally incur a $35.00 fee.  Telephone visits:  Time spent on the phone: *charged based on time that is spent on the phone in increments of 10 minutes. Estimated cost:   5-10 mins $30.00   11-20 mins. $59.00   21-30 mins. $85.00     As always, Thank you for trusting us with your health care needs!            Discharge SUDHEER Blake  [facility]    Preventive Health Recommendations  Female Ages 50 - 64    Yearly exam: See your health care provider every year in order to  o Review health changes.   o Discuss preventive care.    o Review your medicines if your doctor has prescribed any.      Get a Pap test every three years (unless you have an abnormal result and your  provider advises testing more often).    If you get Pap tests with HPV test, you only need to test every 5 years, unless you have an abnormal result.     You do not need a Pap test if your uterus was removed (hysterectomy) and you have not had cancer.    You should be tested each year for STDs (sexually transmitted diseases) if you're at risk.     Have a mammogram every 1 to 2 years.    Have a colonoscopy at age 50, or have a yearly FIT test (stool test). These exams screen for colon cancer.      Have a cholesterol test every 5 years, or more often if advised.    Have a diabetes test (fasting glucose) every three years. If you are at risk for diabetes, you should have this test more often.     If you are at risk for osteoporosis (brittle bone disease), think about having a bone density scan (DEXA).    Shots: Get a flu shot each year. Get a tetanus shot every 10 years.    Nutrition:     Eat at least 5 servings of fruits and vegetables each day.    Eat whole-grain bread, whole-wheat pasta and brown rice instead of white grains and rice.    Talk to your provider about Calcium and Vitamin D.     Lifestyle    Exercise at least 150 minutes a week (30 minutes a day, 5 days a week). This will help you control your weight and prevent disease.    Limit alcohol to one drink per day.    No smoking.     Wear sunscreen to prevent skin cancer.     See your dentist every six months for an exam and cleaning.    See your eye doctor every 1 to 2 years.

## 2017-09-15 NOTE — LETTER
September 25, 2017    Toi Mg  [address]  [address]  [place] [address]    Dear Toi,  We are happy to inform you that your PAP smear result from 9/15/17 is normal.  We are now able to do a follow up test on PAP smears. The DNA test is for HPV (Human Papilloma Virus). Cervical cancer is closely linked with certain types of HPV. Your result showed no evidence of high risk HPV.  Therefore we recommend you return in 3 years for your next pap smear.  You will still need to return to the clinic every year for an annual exam and other preventive tests.  Please contact the clinic at [phone] with any questions.  Sincerely,    Lashonda Sunshine MD/luis

## 2017-09-15 NOTE — PROGRESS NOTES
SUBJECTIVE:   CC: Toi Mg is an 52 year old woman who presents for preventive health visit.     Healthy Habits:    Do you get at least three servings of calcium containing foods daily (dairy, green leafy vegetables, etc.)? no, taking calcium and/or vitamin D supplement: yes - multi vitamin     Amount of exercise or daily activities, outside of work:physical at work     Problems taking medications regularly No    Medication side effects: No    Have you had an eye exam in the past two years? no    Do you see a dentist twice per year? no    Do you have sleep apnea, excessive snoring or daytime drowsiness?no    Concern: anxiety,  Depression, and headaches, questions if she is having panic attacks again   Depression and Anxiety Follow-Up    Status since last visit: more anxious and has panic attacks-has been Missing work and worried that she may get Fired    Cousin Diagnosed with Breast ca    Pt has Financial Problems    Other associated symptoms:None    Complicating factors:     Significant life event: Yes-  As above     Current substance abuse: None    PHQ-9 SCORE 6/13/2017 7/19/2017 9/15/2017   Total Score - - -   Total Score 7 4 14     LAURI-7 SCORE 11/18/2016 2/1/2017 6/13/2017   Total Score - - -   Total Score 13 10 10       PHQ-9  English  PHQ-9   Any Language  GAD7  Migraine Follow-Up    Headaches symptoms:  Stable     Frequency: twice a month     Duration of headaches: takes maxalt    Pt does get tension HA    Able to do normal daily activities/work with migraines: No -     Rescue/Relief medication:Maxalt              Effectiveness: moderate relief    Preventative medication: None    Neurologic complications: No new stroke-like symptoms, loss of vision or speech, numbness or weakness    In the past 4 weeks, how often have you gone to Urgent Care or the emergency room because of your headaches?  0          Today's PHQ-2 Score:   PHQ-2 ( 1999 [employer]) 11/18/2016 11/18/2016   Q1: Little interest or  pleasure in doing things 0 0   Q2: Feeling down, depressed or hopeless 2 0   PHQ-2 Score 2 0     Abuse: Current or Past(Physical, Sexual or Emotional)- No  Do you feel safe in your environment - Yes  Social History   Substance Use Topics     Smoking status: Never Smoker     Smokeless tobacco: Never Used     Alcohol use No     The patient does not drink >3 drinks per day nor >7 drinks per week.    Reviewed orders with patient.  Reviewed health maintenance and updated orders accordingly - Yes  Willard Chaparro    Labs reviewed in EPIC  BP Readings from Last 3 Encounters:   09/15/17 130/86   17 122/80   17 132/84    Wt Readings from Last 3 Encounters:   09/15/17 196 lb 3.2 oz (89 kg)   17 193 lb (87.5 kg)   17 198 lb (89.8 kg)                  Patient Active Problem List   Diagnosis     Migraines     Back pain     Vitamin B12 deficiency without anemia     Low iron     Status post gastric bypass for obesity     Moderate major depression (H)     Anxiety     Insomnia     Migraine     Hyperlipidemia LDL goal <160     Family history of colon cancer     Hypothyroidism     Non morbid obesity due to excess calories     Family history of colonic polyps     Vitamin D deficiency     Past Surgical History:   Procedure Laterality Date     C  DELIVERY ONLY  1988     GASTRIC BYPASS  2002       Social History   Substance Use Topics     Smoking status: Never Smoker     Smokeless tobacco: Never Used     Alcohol use No     Family History   Problem Relation Age of Onset     HEART DISEASE Mother      chf d age 67 from Throat ca     Respiratory Mother      Arthritis Mother      CANCER Mother      Throat     CANCER Father      d of Prostate ca d age 65     Prostate Cancer Father      C.A.D. Father      cabg age 60     CEREBROVASCULAR DISEASE Paternal Grandmother      DIABETES Brother      Asthma Son      Breast Cancer Cousin          Current Outpatient Prescriptions   Medication Sig Dispense Refill      Ferrous Sulfate (IRON SUPPLEMENT PO) Take 65 mg by mouth daily       multivitamin, therapeutic with minerals (MULTI-VITAMIN) TABS tablet Take 1 tablet by mouth daily       clonazePAM (KLONOPIN) 0.5 MG tablet Take 0.5-1 tablets (0.25-0.5 mg) by mouth 2 times daily as needed for anxiety 20 tablet 0     SUMAtriptan (IMITREX) 50 MG tablet Take 1 tablet (50 mg) by mouth at onset of headache for migraine May repeat dose in 2 hours.  Do not exceed 200 mg in 24 hours 18 tablet 1     venlafaxine (EFFEXOR-XR) 150 MG 24 hr capsule Take 1 capsule (150 mg) by mouth daily 30 capsule 3     ibuprofen 200 MG capsule Take 800 mg by mouth every 4 hours as needed for fever       Aspirin-Acetaminophen-Caffeine (EXCEDRIN PO)        Allergies   Allergen Reactions     Gantrisin [Ethanol]      Pyridium [Barbiturates]      Recent Labs   Lab Test  08/10/16   0925  06/26/15   1107  07/02/14   0807  04/11/13   0850  07/26/12   1145   12/03/09   0946   LDL   --   101  104  132*   --    < >  120   HDL   --   69  61  84   --    < >  92   TRIG   --   170*  115  72   --    < >  86   ALT   --    --    --    --   26   --   20   CR   --   0.70   --    --   0.60   --    --    GFRESTIMATED   --   88   --    --   >90   --    --    GFRESTBLACK   --   >90   GFR Calc     --    --   >90   --    --    POTASSIUM   --   4.0   --    --    --    --    --    TSH  4.43*  5.61*   --    --    --    < >   --     < > = values in this interval not displayed.              Pt has had mammogram and will get annual mammogram    Pertinent mammograms are reviewed under the imaging tab.  History of abnormal Pap smear: NO - age 30- 65 PAP every 3 years recommended    Reviewed and updated as needed this visit by clinical staffTobacco  Allergies  Meds  Problems  Med Hx  Surg Hx  Fam Hx  Soc Hx        Reviewed and updated as needed this visit by Provider  Problems        Past Medical History:   Diagnosis Date     Depression, major      LAURI (generalized  "anxiety disorder)      Hyperlipidemia      Hypothyroidism      Migraines      Obesity       Past Surgical History:   Procedure Laterality Date     C  DELIVERY ONLY       GASTRIC BYPASS  2002       ROS:  C: NEGATIVE for fever, chills, change in weight  I: NEGATIVE for worrisome rashes, moles or lesions  E: NEGATIVE for vision changes or irritation  ENT: NEGATIVE for ear, mouth and throat problems  R: NEGATIVE for significant cough or SOB  B: NEGATIVE for masses, tenderness or discharge  CV: NEGATIVE for chest pain, palpitations or peripheral edema  GI: NEGATIVE for nausea, abdominal pain, heartburn, or change in bowel habits  : NEGATIVE for unusual urinary or vaginal symptoms. No vaginal bleeding.  M: NEGATIVE for significant arthralgias or myalgia  N: NEGATIVE for weakness, dizziness or paresthesias  E: NEGATIVE for temperature intolerance, skin/hair changes  PSYCHIATRIC: as above     OBJECTIVE:   /86 (BP Location: Left arm, Patient Position: Chair, Cuff Size: Adult Regular)  Pulse 70  Temp 98.4  F (36.9  C) (Oral)  Resp 15  Ht 5' 3.5\" (1.613 m)  Wt 196 lb 3.2 oz (89 kg)  SpO2 97%  Breastfeeding? No  BMI 34.21 kg/m2  EXAM:  GENERAL APPEARANCE: healthy, alert and no distress  EYES: Eyes grossly normal to inspection, PERRL and conjunctivae and sclerae normal  HENT: ear canals and TM's normal, nose and mouth without ulcers or lesions, oropharynx clear and oral mucous membranes moist  NECK: no adenopathy, no asymmetry, masses, or scars and thyroid normal to palpation  RESP: lungs clear to auscultation - no rales, rhonchi or wheezes  BREAST: normal without masses, tenderness or nipple discharge and no palpable axillary masses or adenopathy  CV: regular rate and rhythm, normal S1 S2, no S3 or S4, no murmur, click or rub, no peripheral edema and peripheral pulses strong  ABDOMEN: soft, nontender, no hepatosplenomegaly, no masses and bowel sounds normal   (female): normal female external " genitalia, normal urethral meatus, vaginal mucosal atrophy noted, normal cervix, adnexae, and uterus without masses or abnormal discharge  MS: no musculoskeletal defects are noted and gait is age appropriate without ataxia  SKIN: no suspicious lesions or rashes  NEURO: Normal strength and tone, sensory exam grossly normal, mentation intact and speech normal  PSYCH: anxious and tearful    ASSESSMENT/PLAN:   1. Encounter for routine adult physical exam with abnormal findings      2. Moderate major depression (H)  Pt tearful  Needed FMLA done  She is on effexor-Higher dose Gives her side effects  - Vitamin D Deficiency  - MENTAL HEALTH REFERRAL  - OFFICE/OUTPT VISIT,EST,LEVL III  I have referred juventino to psychiatrist  FMLA forms were done Today  3. Anxiety  Advised take Klonipin  Continue effexor  Counseling on DAP and anxiety  See Therapist  - clonazePAM (KLONOPIN) 0.5 MG tablet; Take 0.5-1 tablets (0.25-0.5 mg) by mouth 2 times daily as needed for anxiety  Dispense: 20 tablet; Refill: 0  - MENTAL HEALTH REFERRAL  - OFFICE/OUTPT VISIT,EST,LEVL III    4. Acquired hypothyroidism  TSH is pending     5. Other migraine without status migrainosus, not intractable  Stable   - OFFICE/OUTPT VISIT,EST,LEVL III    6. Family history of colon cancer  Advised again Importance of colonoscopy  Pt declines stool card -says she will schedule    7. Vitamin B12 deficiency without anemia    - Vitamin B12    8. Hyperlipidemia LDL goal <160  Pending     9. Screening for malignant neoplasm of cervix  done  - Pap imaged thin layer screen with HPV - recommended age 30 - 65 years (select HPV order below)  - HPV High Risk Types DNA Cervical    10. Need for hepatitis C screening test  Advised   - Hepatitis C Screen Reflex to HCV RNA Quant and Genotype    11. Need for prophylactic vaccination and inoculation against influenza  Advised     12. Screen for colon cancer  As above    13. Low iron    - Iron and iron binding capacity    14. Administrative  "encounter  FMLA forms done  - OFFICE/OUTPT VISIT,DRU LEGGETT III    COUNSELING:   Reviewed preventive health counseling, as reflected in patient instructions       Regular exercise       Healthy diet/nutrition       Vision screening       Hearing screening       Osteoporosis Prevention/Bone Health       Colon cancer screening       The 10-year ASCVD risk score (Mini ROSS Jr, et al., 2013) is: 1.4%    Values used to calculate the score:      Age: 52 years      Sex: Female      Is Non- : No      Diabetic: No      Tobacco smoker: No      Systolic Blood Pressure: 130 mmHg      Is BP treated: No      HDL Cholesterol: 69 mg/dL      Total Cholesterol: 204 mg/dL         reports that she has never smoked. She has never used smokeless tobacco.    Estimated body mass index is 34.21 kg/(m^2) as calculated from the following:    Height as of this encounter: 5' 3.5\" (1.613 m).    Weight as of this encounter: 196 lb 3.2 oz (89 kg).   Weight management plan: low nicolasa diet/Exercsie    Counseling Resources:  ATP IV Guidelines  Pooled Cohorts Equation Calculator  Breast Cancer Risk Calculator  FRAX Risk Assessment  ICSI Preventive Guidelines  Dietary Guidelines for Americans, 2010  USDA's MyPlate  ASA Prophylaxis  Lung CA Screening    Lashonda Sunshine MD  [facility]  "

## 2017-09-15 NOTE — MR AVS SNAPSHOT
After Visit Summary   9/15/2017    Toi Mg    MRN: [id number]           Patient Information     Date Of Birth          1965        Visit Information        Provider Department      9/15/2017 10:00 AM Lashonda Sunshine MD [facility]        Today's Diagnoses     Routine general medical examination at a health care facility    -  1    Screening for malignant neoplasm of cervix        Need for hepatitis C screening test        Need for prophylactic vaccination and inoculation against influenza        Hyperlipidemia LDL goal <160        Family history of colon cancer        Vitamin B12 deficiency without anemia        Screen for colon cancer        Low iron        Moderate major depression (H)        Acquired hypothyroidism        Anxiety        Other migraine without status migrainosus, not intractable          Care Instructions    [facility]    If you have any questions regarding to your visit please contact your care team:       Team Red:   Clinic Hours Telephone Number   Dr. Josy Villaseñor  (pediatrics)  Courtney Ornelas NP 7am-7pm  Monday - Thursday   7am-5pm  Fridays  [phone] (fax)    Donaldo YUEN  [phone]   Urgent Care - [place] and [place] Monday-Friday  [place] - 11am-8pm  Saturday-Sunday  Both sites - 9am-5pm  [phone] - [facility]  [phone] - [place]       What options do I have for visits at the clinic other than the traditional office visit?  To expand how we care for you, many of our providers are utilizing electronic visits (e-visits) and telephone visits, when medically appropriate, for interactions with their patients rather than a visit in the clinic.   We also offer nurse visits for many medical concerns. Just like any other service, we will bill your insurance company for this type of visit based on time spent on the phone with your provider. Not all insurance companies  cover these visits. Please check with your medical insurance if this type of visit is covered. You will be responsible for any charges that are not paid by your insurance.      E-visits via [employer]:  generally incur a $35.00 fee.  Telephone visits:  Time spent on the phone: *charged based on time that is spent on the phone in increments of 10 minutes. Estimated cost:   5-10 mins $30.00   11-20 mins. $59.00   21-30 mins. $85.00     As always, Thank you for trusting us with your health care needs!            Discharge SUDHEER Blake  [facility]    Preventive Health Recommendations  Female Ages 50 - 64    Yearly exam: See your health care provider every year in order to  o Review health changes.   o Discuss preventive care.    o Review your medicines if your doctor has prescribed any.      Get a Pap test every three years (unless you have an abnormal result and your provider advises testing more often).    If you get Pap tests with HPV test, you only need to test every 5 years, unless you have an abnormal result.     You do not need a Pap test if your uterus was removed (hysterectomy) and you have not had cancer.    You should be tested each year for STDs (sexually transmitted diseases) if you're at risk.     Have a mammogram every 1 to 2 years.    Have a colonoscopy at age 50, or have a yearly FIT test (stool test). These exams screen for colon cancer.      Have a cholesterol test every 5 years, or more often if advised.    Have a diabetes test (fasting glucose) every three years. If you are at risk for diabetes, you should have this test more often.     If you are at risk for osteoporosis (brittle bone disease), think about having a bone density scan (DEXA).    Shots: Get a flu shot each year. Get a tetanus shot every 10 years.    Nutrition:     Eat at least 5 servings of fruits and vegetables each day.    Eat whole-grain bread, whole-wheat pasta and brown rice instead of white grains and rice.    Talk to your provider  about Calcium and Vitamin D.     Lifestyle    Exercise at least 150 minutes a week (30 minutes a day, 5 days a week). This will help you control your weight and prevent disease.    Limit alcohol to one drink per day.    No smoking.     Wear sunscreen to prevent skin cancer.     See your dentist every six months for an exam and cleaning.    See your eye doctor every 1 to 2 years.            Follow-ups after your visit        Additional Services     MENTAL HEALTH REFERRAL       Your provider has referred you to: Behavioral Healthcare Providers Intake Scheduling [phone]  [email or web address] to see [facility]iatrist    Please be aware that coverage of these services is subject to the terms and limitations of your health insurance plan.  Call member services at your health plan with any benefit or coverage questions.      Please bring the following to your appointment:  >>   Any x-rays, CTs or MRIs which have been performed.  Contact the facility where they were done to arrange for  prior to your scheduled appointment.  Any new CT, MRI or other procedures ordered by your specialist must be performed at a [facility] facility or coordinated by your clinic's referral office.    >>   List of current medications   >>   This referral request   >>   Any documents/labs given to you for this referral                  Who to contact     If you have questions or need follow up information about today's clinic visit or your schedule please contact [facility] directly at [phone].  Normal or non-critical lab and imaging results will be communicated to you by MyChart, letter or phone within 4 business days after the clinic has received the results. If you do not hear from us within 7 days, please contact the clinic through MyChart or phone. If you have a critical or abnormal lab result, we will notify you by phone as soon as possible.  Submit refill requests through [employer] or call your pharmacy and  "they will forward the refill request to us. Please allow 3 business days for your refill to be completed.          Additional Information About Your Visit        [employer] Information     anfix lets you send messages to your doctor, view your test results, renew your prescriptions, schedule appointments and more. To sign up, go to [email or web address] . Click on \"Log in\" on the left side of the screen, which will take you to the Welcome page. Then click on \"Sign up Now\" on the right side of the page.     You will be asked to enter the access code listed below, as well as some personal information. Please follow the directions to create your username and password.     Your access code is: [email or web address]  Expires: 2017 11:08 AM     Your access code will  in 90 days. If you need help or a new code, please call your [place] clinic or [phone].        Care EveryWhere ID     This is your [facility] EveryWhere ID. This could be used by other organizations to access your [place] medical records  [id number]        Your Vitals Were     Pulse Temperature Respirations Height Pulse Oximetry Breastfeeding?    70 98.4  F (36.9  C) (Oral) 15 5' 3.5\" (1.613 m) 97% No    BMI (Body Mass Index)                   34.21 kg/m2            Blood Pressure from Last 3 Encounters:   09/15/17 130/86   17 122/80   17 132/84    Weight from Last 3 Encounters:   09/15/17 196 lb 3.2 oz (89 kg)   17 193 lb (87.5 kg)   17 198 lb (89.8 kg)              We Performed the Following     Glucose     Hepatitis C Screen Reflex to HCV RNA Quant and Genotype     HPV High Risk Types DNA Cervical     Iron and iron binding capacity     Lipid panel reflex to direct LDL     MENTAL HEALTH REFERRAL     Pap imaged thin layer screen with HPV - recommended age 30 - 65 years (select HPV order below)     TSH WITH FREE T4 REFLEX     Vitamin B12     Vitamin D Deficiency          Today's Medication Changes          These changes are " accurate as of: 9/15/17 11:08 AM.  If you have any questions, ask your nurse or doctor.               Start taking these medicines.        Dose/Directions    clonazePAM 0.5 MG tablet   Commonly known as:  klonoPIN   Used for:  Anxiety   Started by:  Lashonda Sunshine MD        Dose:  0.25-0.5 mg   Take 0.5-1 tablets (0.25-0.5 mg) by mouth 2 times daily as needed for anxiety   Quantity:  20 tablet   Refills:  0            Where to get your medicines      Some of these will need a paper prescription and others can be bought over the counter.  Ask your nurse if you have questions.     Bring a paper prescription for each of these medications     clonazePAM 0.5 MG tablet                Primary Care Provider Office Phone # Fax #    Lashonda Sunshine [phone] [facility] [address]        Equal Access to Services     [facility]: Hadii yanet vlila hadasho Soomaali, waaxda luqadaha, qaybta kaalmada adeestradayada, brittany pruitt . So [facility] [phone].    ATENCIÓN: Si habla español, tiene a bishop disposición servicios gratuitos de asistencia lingüística. Llame al [phone].    We comply with applicable federal civil rights laws and [place] laws. We do not discriminate on the basis of race, color, national origin, age, disability sex, sexual orientation or gender identity.            Thank you!     Thank you for choosing [facility]  for your care. Our goal is always to provide you with excellent care. Hearing back from our patients is one way we can continue to improve our services. Please take a few minutes to complete the written survey that you may receive in the mail after your visit with us. Thank you!             Your Updated Medication List - Protect others around you: Learn how to safely use, store and throw away your medicines at www.disposemymeds.org.          This list is accurate as of: 9/15/17 11:08 AM.  Always use your most recent med list.                    Brand Name Dispense Instructions for use Diagnosis    clonazePAM 0.5 MG tablet    klonoPIN    20 tablet    Take 0.5-1 tablets (0.25-0.5 mg) by mouth 2 times daily as needed for anxiety    Anxiety       EXCEDRIN PO           ibuprofen 200 MG capsule      Take 800 mg by mouth every 4 hours as needed for fever        IRON SUPPLEMENT PO      Take 65 mg by mouth daily        Multi-vitamin Tabs tablet      Take 1 tablet by mouth daily        SUMAtriptan 50 MG tablet    IMITREX    18 tablet    Take 1 tablet (50 mg) by mouth at onset of headache for migraine May repeat dose in 2 hours.  Do not exceed 200 mg in 24 hours    Migraine without status migrainosus, not intractable, unspecified migraine type       venlafaxine 150 MG 24 hr capsule    EFFEXOR-XR    30 capsule    Take 1 capsule (150 mg) by mouth daily    Anxiety, Major depressive disorder, recurrent episode, moderate (H)

## 2017-09-15 NOTE — LETTER
[facility]  [address] [place] [address]    October 2, 2017    Toi Mari  [address]  [address]  [facility] [address]          Dear Toi,    Your cholesterol is high. I am sending a low cholesterol diet. Exercise.   Take 1000 units Vitamin D daily   Thyroid is Borderline -take 1000 units Vitamin D daily   Blood sugar is Good   Take care    Enclosed is a copy of your results.     Results for orders placed or performed in visit on 09/15/17   Hepatitis C Screen Reflex to HCV RNA Quant and Genotype   Result Value Ref Range    Hepatitis C Antibody Nonreactive NR^Nonreactive   Lipid panel reflex to direct LDL   Result Value Ref Range    Cholesterol 246 (H) <200 mg/dL    Triglycerides 114 <150 mg/dL    HDL Cholesterol 95 >49 mg/dL    LDL Cholesterol Calculated 128 (H) <100 mg/dL    Non HDL Cholesterol 151 (H) <130 mg/dL   TSH WITH FREE T4 REFLEX   Result Value Ref Range    TSH 6.29 (H) 0.40 - 4.00 mU/L   Pap imaged thin layer screen with HPV - recommended age 30 - 65 years (select HPV order below)   Result Value Ref Range    PAP NIL     Copath Report         Patient Name: TOI MARI  MR#: [id number]  Specimen #: [id number]  Collected: 9/15/2017  Received: 9/18/2017  Reported: 9/19/2017 14:47  Ordering Phy(s): DAVIN SHELTON    For improved result formatting, select 'View Enhanced Report Format'  under Linked Documents section.    SPECIMEN/STAIN PROCESS:  Pap imaged thin layer prep screening (Surepath, FocalPoint with guided  screening)       Pap-Cyto x 1, HPV ordered x 1    SOURCE: Cervical, endocervical  ----------------------------------------------------------------   Pap imaged thin layer prep screening (Surepath, FocalPoint with guided  screening)  SPECIMEN ADEQUACY:  Satisfactory for evaluation.  -Transitional zone component could not be determined due to atrophy.    CYTOLOGIC INTERPRETATION:    Negative for intraepithelial  lesion or malignancy    Electronically signed out by:  TRU Huang (ASCP)    Processed and screened at [facility]    CLINICAL HISTORY:    Post  Menopausal, Previous normal pap  Date of Last Pap: 7/2/2014,    Papanicolaou Test Limitations:  Cervical cytology is a screening test  with limited sensitivity; regular screening is critical for cancer  prevention; Pap tests are primarily effective for the  diagnosis/prevention of squamous cell carcinoma, not adenocarcinomas or  other cancers.    TESTING LAB LOCATION:  [address]  [phone]    COLLECTION SITE:  Client:  [facility]  Location: [facility] (B)     HPV High Risk Types DNA Cervical   Result Value Ref Range    HPV 16 DNA Negative NEG^Negative    HPV 18 DNA Negative NEG^Negative    Other HR HPV Negative NEG^Negative    Final Diagnosis This patient's sample is negative for HPV DNA.     Specimen Description Cervical Cells    Glucose   Result Value Ref Range    Glucose 97 70 - 99 mg/dL   Vitamin B12   Result Value Ref Range    Vitamin B12 335 193 - 986 pg/mL   Iron and iron binding capacity   Result Value Ref Range    Iron 103 35 - 180 ug/dL    Iron Binding Cap 486 (H) 240 - 430 ug/dL    Iron Saturation Index 21 15 - 46 %   Vitamin D Deficiency   Result Value Ref Range    Vitamin D Deficiency screening 25 20 - 75 ug/L   T4 free   Result Value Ref Range    T4 Free 1.06 0.76 - 1.46 ng/dL       If you have any questions or concerns, please call myself or my nurse at [phone].      Sincerely,        Lashonda Sunshine MD/ROSENDA

## 2017-09-16 ASSESSMENT — ANXIETY QUESTIONNAIRES: GAD7 TOTAL SCORE: 15

## 2017-09-19 LAB
COPATH REPORT: NORMAL
PAP: NORMAL

## 2017-09-21 LAB
FINAL DIAGNOSIS: NORMAL
HPV HR 12 DNA CVX QL NAA+PROBE: NEGATIVE
HPV16 DNA SPEC QL NAA+PROBE: NEGATIVE
HPV18 DNA SPEC QL NAA+PROBE: NEGATIVE
SPECIMEN DESCRIPTION: NORMAL

## 2017-10-09 ENCOUNTER — TELEPHONE (OUTPATIENT)
Dept: FAMILY MEDICINE | Facility: CLINIC | Age: 52
End: 2017-10-09

## 2017-10-09 NOTE — TELEPHONE ENCOUNTER
Received a return call from Toi.    She will come with her original copy and have the  make a copy and get it to me to be faxed.    She will ask for Di to be lynced. She needs this done ASAP. Rose Montero,

## 2017-10-09 NOTE — TELEPHONE ENCOUNTER
REASON FOR MESSAGE  Patient is requesting:  FMLA forms that were done at last office visit 9/15/2017  FORMS      Type of form needing completion:  FMLA form    Once complete:  Fax form to:  [phone]    Has patient seen provider for appointment related to diagnosis or condition in form?  Yes      Name of provider:  Lashonda Sunshine    Date of visit:  09/15/2017

## 2017-10-10 NOTE — TELEPHONE ENCOUNTER
Toi brought in the forms. A copy was received and confirmed faxed to the fax number on the form to [place].    Patient was informed that this was confirmed faxed. On the fax header there is a note to contact both the clinic and the Toi if more is needed. Rose Montero,

## 2017-10-24 ENCOUNTER — TRANSFERRED RECORDS (OUTPATIENT)
Dept: HEALTH INFORMATION MANAGEMENT | Facility: CLINIC | Age: 52
End: 2017-10-24

## 2017-10-24 LAB — PHQ9 SCORE: 12

## 2017-11-06 ENCOUNTER — TRANSFERRED RECORDS (OUTPATIENT)
Dept: HEALTH INFORMATION MANAGEMENT | Facility: CLINIC | Age: 52
End: 2017-11-06

## 2017-12-07 LAB — PHQ9 SCORE: 16

## 2018-01-05 ENCOUNTER — TRANSFERRED RECORDS (OUTPATIENT)
Dept: HEALTH INFORMATION MANAGEMENT | Facility: CLINIC | Age: 53
End: 2018-01-05

## 2018-01-11 LAB — PHQ9 SCORE: 12

## 2018-02-09 ENCOUNTER — TRANSFERRED RECORDS (OUTPATIENT)
Dept: HEALTH INFORMATION MANAGEMENT | Facility: CLINIC | Age: 53
End: 2018-02-09

## 2018-02-13 ENCOUNTER — TELEPHONE (OUTPATIENT)
Dept: FAMILY MEDICINE | Facility: CLINIC | Age: 53
End: 2018-02-13

## 2018-02-13 NOTE — TELEPHONE ENCOUNTER
Called and asked for patient to drop off the forms. She will do this at the [facility]. Rose Montero,

## 2018-02-13 NOTE — TELEPHONE ENCOUNTER
Reason for Call:  Other call back    Detailed comments: Patient calling she had a gastric by pass in 2002 and needs to have a form signed stating that she is ok to donate plasma. Please advise.    Phone Number Patient can be reached at: Home number on file [phone] (home)    Best Time: Any     Can we leave a detailed message on this number? YES    Call taken on 2/13/2018 at 12:25 PM by Mitzy Ayala

## 2018-02-21 ENCOUNTER — TELEPHONE (OUTPATIENT)
Dept: FAMILY MEDICINE | Facility: CLINIC | Age: 53
End: 2018-02-21

## 2018-02-21 NOTE — TELEPHONE ENCOUNTER
Patient called to let us know as an FYI that she has been donating plasma for the last 5 years and this is NOT new.  Just needs form updated. Jennifer Santo MA

## 2018-02-21 NOTE — TELEPHONE ENCOUNTER
Patient called back, PHQ-9 completed.  Appointment offered, patient declined states she is seeing/following psychitry for her depression.  Jennifer Santo MA

## 2018-02-22 ENCOUNTER — TELEPHONE (OUTPATIENT)
Dept: FAMILY MEDICINE | Facility: CLINIC | Age: 53
End: 2018-02-22

## 2018-02-22 DIAGNOSIS — F41.9 ANXIETY: ICD-10-CM

## 2018-02-22 ASSESSMENT — PATIENT HEALTH QUESTIONNAIRE - PHQ9: SUM OF ALL RESPONSES TO PHQ QUESTIONS 1-9: 15

## 2018-02-23 NOTE — TELEPHONE ENCOUNTER
Called and informed Toi that these have been completed and available at the [facility] . Rose Montero,

## 2018-03-07 ENCOUNTER — TRANSFERRED RECORDS (OUTPATIENT)
Dept: HEALTH INFORMATION MANAGEMENT | Facility: CLINIC | Age: 53
End: 2018-03-07

## 2018-03-08 ENCOUNTER — TELEPHONE (OUTPATIENT)
Dept: FAMILY MEDICINE | Facility: CLINIC | Age: 53
End: 2018-03-08

## 2018-03-08 NOTE — TELEPHONE ENCOUNTER
Reason for Call:  Form, our goal is to have forms completed with 72 hours, however, some forms may require a visit or additional information.    Type of letter, form or note:  medical    Who is the form from?: Patient    Where did the form come from: Patient or family brought in       What clinic location was the form placed at?: [facility]    Where the form was placed: 's Box    What number is listed as a contact on the form?: [phone]       Additional comments: patient would like to  form when completed thank you    Call taken on 3/8/2018 at 8:40 AM by ABUNDIO PEREZ

## 2018-03-12 NOTE — TELEPHONE ENCOUNTER
Provider completed the form.    Called and left a message for patient they are ready for  at the [facility] .    A copy of the competed form has been sent to be added to the chart. Rose Montero,

## 2018-03-13 ENCOUNTER — TELEPHONE (OUTPATIENT)
Dept: FAMILY MEDICINE | Facility: CLINIC | Age: 53
End: 2018-03-13

## 2018-03-13 NOTE — TELEPHONE ENCOUNTER
"Reason for Call:  [facility] will be faxing over a form for completion    Detailed comments: Patient states she has been donating blood at another location over the past 5 years. There will be a form faxed over to the clinic today for you and she would like for you to check the \"yes\" box to give her the ok to donate. Call if any questions. Thank you.    Phone Number Patient can be reached at: Home number on file [phone] (home)    Best Time: any    Can we leave a detailed message on this number? YES    Call taken on 3/13/2018 at 1:48 PM by Delia Short      "

## 2018-03-16 ENCOUNTER — TELEPHONE (OUTPATIENT)
Dept: FAMILY MEDICINE | Facility: CLINIC | Age: 53
End: 2018-03-16

## 2018-03-16 NOTE — TELEPHONE ENCOUNTER
Reason for Call:  [facility] paper work    Detailed comments: Patient would like a call back regarding paper work, states there is a box that is not getting check marked and this needs to be done. Please call.    Phone Number Patient can be reached at: Home number on file [phone] (home)    Best Time: anytime today    Can we leave a detailed message on this number? YES    Call taken on 3/16/2018 at 12:03 PM by Delia Short

## 2018-03-19 ENCOUNTER — TELEPHONE (OUTPATIENT)
Dept: FAMILY MEDICINE | Facility: CLINIC | Age: 53
End: 2018-03-19

## 2018-03-19 NOTE — TELEPHONE ENCOUNTER
Reason for Call:  Other call back    Detailed comments: Patient states there was a box on her forms for donating plasma that was not checked. They need to know if she is ok to donate plasma twice a week. Patient states the faxed form back to Dr. Sunshine. Please call patient with any questions.     Phone Number Patient can be reached at: Home number on file [phone] (home)    Best Time: Any    Can we leave a detailed message on this number? YES    Call taken on 3/19/2018 at 3:30 PM by Mitzy Ayala

## 2018-03-19 NOTE — TELEPHONE ENCOUNTER
Called [facility]    They need to know if the patients' Hypothyroidism is due to an auto-immune in nature?    They also need to know if patient is able to donate Plasma?    Will send to the provider to advise.     Rose Montero,

## 2018-03-19 NOTE — TELEPHONE ENCOUNTER
See phone encounter date 3/16/2018. Care team has contacted the Plasma donation center.     TC to huddle with the provider to get answers to the questions. Rose Montero,

## 2018-03-21 NOTE — TELEPHONE ENCOUNTER
Provider completed form. Patient picked up at the . A copy has been sent to be added to the chart. Rose Montero,

## 2018-09-10 ENCOUNTER — HEALTH MAINTENANCE LETTER (OUTPATIENT)
Age: 53
End: 2018-09-10

## 2018-12-01 ENCOUNTER — TRANSFERRED RECORDS (OUTPATIENT)
Dept: HEALTH INFORMATION MANAGEMENT | Facility: CLINIC | Age: 53
End: 2018-12-01

## 2018-12-04 ENCOUNTER — TRANSFERRED RECORDS (OUTPATIENT)
Dept: HEALTH INFORMATION MANAGEMENT | Facility: CLINIC | Age: 53
End: 2018-12-04

## 2018-12-05 ENCOUNTER — TRANSFERRED RECORDS (OUTPATIENT)
Dept: HEALTH INFORMATION MANAGEMENT | Facility: CLINIC | Age: 53
End: 2018-12-05

## 2018-12-10 LAB — EJECTION FRACTION: 70

## 2018-12-11 ENCOUNTER — TRANSFERRED RECORDS (OUTPATIENT)
Dept: HEALTH INFORMATION MANAGEMENT | Facility: CLINIC | Age: 53
End: 2018-12-11

## 2018-12-11 LAB
ALT SERPL-CCNC: 20 IU/L (ref 8–45)
AST SERPL-CCNC: 22 IU/L (ref 2–40)
CREAT SERPL-MCNC: 0.68 MG/DL (ref 0.57–1.11)
GFR SERPL CREATININE-BSD FRML MDRD: >60 ML/MIN/1.73M2
GLUCOSE SERPL-MCNC: 106 MG/DL (ref 65–100)
POTASSIUM SERPL-SCNC: 3.7 MMOL/L (ref 3.5–5)

## 2018-12-13 ENCOUNTER — AMBULATORY - HEALTHEAST (OUTPATIENT)
Dept: ADMINISTRATIVE | Facility: CLINIC | Age: 53
End: 2018-12-13

## 2018-12-13 ENCOUNTER — OFFICE VISIT - HEALTHEAST (OUTPATIENT)
Dept: GERIATRICS | Facility: CLINIC | Age: 53
End: 2018-12-13

## 2018-12-13 DIAGNOSIS — G06.2 EPIDURAL ABSCESS: ICD-10-CM

## 2018-12-13 DIAGNOSIS — M54.12 CERVICAL RADICULOPATHY: ICD-10-CM

## 2018-12-13 DIAGNOSIS — Z98.84 HISTORY OF GASTRIC BYPASS: ICD-10-CM

## 2018-12-13 DIAGNOSIS — Z98.1 STATUS POST CERVICAL SPINAL FUSION: ICD-10-CM

## 2018-12-17 ENCOUNTER — OFFICE VISIT - HEALTHEAST (OUTPATIENT)
Dept: GERIATRICS | Facility: CLINIC | Age: 53
End: 2018-12-17

## 2018-12-17 ENCOUNTER — RECORDS - HEALTHEAST (OUTPATIENT)
Dept: LAB | Facility: CLINIC | Age: 53
End: 2018-12-17

## 2018-12-17 DIAGNOSIS — F32.A DEPRESSION, UNSPECIFIED DEPRESSION TYPE: ICD-10-CM

## 2018-12-17 DIAGNOSIS — D64.9 ANEMIA, UNSPECIFIED TYPE: ICD-10-CM

## 2018-12-17 DIAGNOSIS — G06.2 EPIDURAL ABSCESS: ICD-10-CM

## 2018-12-17 DIAGNOSIS — Z98.84 HISTORY OF GASTRIC BYPASS: ICD-10-CM

## 2018-12-17 DIAGNOSIS — Z98.1 STATUS POST CERVICAL SPINAL FUSION: ICD-10-CM

## 2018-12-17 DIAGNOSIS — F41.9 ANXIETY: ICD-10-CM

## 2018-12-17 LAB
BASOPHILS # BLD AUTO: 0.1 THOU/UL (ref 0–0.2)
BASOPHILS NFR BLD AUTO: 1 % (ref 0–2)
C REACTIVE PROTEIN LHE: 7.1 MG/DL (ref 0–0.8)
CREAT SERPL-MCNC: 0.57 MG/DL (ref 0.6–1.1)
EOSINOPHIL # BLD AUTO: 0.2 THOU/UL (ref 0–0.4)
EOSINOPHIL NFR BLD AUTO: 2 % (ref 0–6)
ERYTHROCYTE [DISTWIDTH] IN BLOOD BY AUTOMATED COUNT: 14.1 % (ref 11–14.5)
ERYTHROCYTE [SEDIMENTATION RATE] IN BLOOD BY WESTERGREN METHOD: 120 MM/HR (ref 0–20)
GFR SERPL CREATININE-BSD FRML MDRD: >60 ML/MIN/1.73M2
HCT VFR BLD AUTO: 30.8 % (ref 35–47)
HGB BLD-MCNC: 9.5 G/DL (ref 12–16)
LYMPHOCYTES # BLD AUTO: 1.9 THOU/UL (ref 0.8–4.4)
LYMPHOCYTES NFR BLD AUTO: 31 % (ref 20–40)
MCH RBC QN AUTO: 30.8 PG (ref 27–34)
MCHC RBC AUTO-ENTMCNC: 30.8 G/DL (ref 32–36)
MCV RBC AUTO: 100 FL (ref 80–100)
MONOCYTES # BLD AUTO: 0.6 THOU/UL (ref 0–0.9)
MONOCYTES NFR BLD AUTO: 9 % (ref 2–10)
NEUTROPHILS # BLD AUTO: 3.6 THOU/UL (ref 2–7.7)
NEUTROPHILS NFR BLD AUTO: 57 % (ref 50–70)
PLATELET # BLD AUTO: 524 THOU/UL (ref 140–440)
PMV BLD AUTO: 9.2 FL (ref 8.5–12.5)
RBC # BLD AUTO: 3.08 MILL/UL (ref 3.8–5.4)
WBC: 6.3 THOU/UL (ref 4–11)

## 2018-12-20 ENCOUNTER — TRANSFERRED RECORDS (OUTPATIENT)
Dept: HEALTH INFORMATION MANAGEMENT | Facility: CLINIC | Age: 53
End: 2018-12-20

## 2018-12-21 ENCOUNTER — AMBULATORY - HEALTHEAST (OUTPATIENT)
Dept: GERIATRICS | Facility: CLINIC | Age: 53
End: 2018-12-21

## 2018-12-26 ENCOUNTER — AMBULATORY - HEALTHEAST (OUTPATIENT)
Dept: ADMINISTRATIVE | Facility: CLINIC | Age: 53
End: 2018-12-26

## 2018-12-26 ENCOUNTER — OFFICE VISIT - HEALTHEAST (OUTPATIENT)
Dept: GERIATRICS | Facility: CLINIC | Age: 53
End: 2018-12-26

## 2018-12-26 DIAGNOSIS — Z98.84 HISTORY OF GASTRIC BYPASS: ICD-10-CM

## 2018-12-26 DIAGNOSIS — D64.9 ANEMIA, UNSPECIFIED TYPE: ICD-10-CM

## 2018-12-26 DIAGNOSIS — F32.A DEPRESSION, UNSPECIFIED DEPRESSION TYPE: ICD-10-CM

## 2018-12-26 DIAGNOSIS — G06.1 SPINAL EPIDURAL ABSCESS: ICD-10-CM

## 2018-12-27 ENCOUNTER — OFFICE VISIT - HEALTHEAST (OUTPATIENT)
Dept: GERIATRICS | Facility: CLINIC | Age: 53
End: 2018-12-27

## 2018-12-27 ENCOUNTER — RECORDS - HEALTHEAST (OUTPATIENT)
Dept: LAB | Facility: CLINIC | Age: 53
End: 2018-12-27

## 2018-12-27 DIAGNOSIS — F32.A DEPRESSION, UNSPECIFIED DEPRESSION TYPE: ICD-10-CM

## 2018-12-27 DIAGNOSIS — Z98.84 HISTORY OF GASTRIC BYPASS: ICD-10-CM

## 2018-12-27 DIAGNOSIS — D64.9 ANEMIA, UNSPECIFIED TYPE: ICD-10-CM

## 2018-12-27 DIAGNOSIS — Z98.1 STATUS POST CERVICAL SPINAL FUSION: ICD-10-CM

## 2018-12-27 DIAGNOSIS — G06.2 EPIDURAL ABSCESS: ICD-10-CM

## 2018-12-27 LAB
BASOPHILS # BLD AUTO: 0 THOU/UL (ref 0–0.2)
BASOPHILS NFR BLD AUTO: 1 % (ref 0–2)
C REACTIVE PROTEIN LHE: 7.6 MG/DL (ref 0–0.8)
EOSINOPHIL # BLD AUTO: 0.2 THOU/UL (ref 0–0.4)
EOSINOPHIL NFR BLD AUTO: 5 % (ref 0–6)
ERYTHROCYTE [DISTWIDTH] IN BLOOD BY AUTOMATED COUNT: 13.9 % (ref 11–14.5)
HCT VFR BLD AUTO: 27.9 % (ref 35–47)
HGB BLD-MCNC: 8.8 G/DL (ref 12–16)
LYMPHOCYTES # BLD AUTO: 1.4 THOU/UL (ref 0.8–4.4)
LYMPHOCYTES NFR BLD AUTO: 28 % (ref 20–40)
MCH RBC QN AUTO: 30.9 PG (ref 27–34)
MCHC RBC AUTO-ENTMCNC: 31.5 G/DL (ref 32–36)
MCV RBC AUTO: 98 FL (ref 80–100)
MONOCYTES # BLD AUTO: 0.6 THOU/UL (ref 0–0.9)
MONOCYTES NFR BLD AUTO: 12 % (ref 2–10)
NEUTROPHILS # BLD AUTO: 2.7 THOU/UL (ref 2–7.7)
NEUTROPHILS NFR BLD AUTO: 55 % (ref 50–70)
PLATELET # BLD AUTO: 373 THOU/UL (ref 140–440)
PMV BLD AUTO: 9 FL (ref 8.5–12.5)
RBC # BLD AUTO: 2.85 MILL/UL (ref 3.8–5.4)
WBC: 5 THOU/UL (ref 4–11)

## 2018-12-28 ENCOUNTER — RECORDS - HEALTHEAST (OUTPATIENT)
Dept: LAB | Facility: CLINIC | Age: 53
End: 2018-12-28

## 2018-12-31 LAB
25(OH)D3 SERPL-MCNC: 15.7 NG/ML (ref 30–80)
BASOPHILS # BLD AUTO: 0 THOU/UL (ref 0–0.2)
BASOPHILS NFR BLD AUTO: 1 % (ref 0–2)
C REACTIVE PROTEIN LHE: 2.8 MG/DL (ref 0–0.8)
CREAT SERPL-MCNC: 0.63 MG/DL (ref 0.6–1.1)
EOSINOPHIL # BLD AUTO: 0.2 THOU/UL (ref 0–0.4)
EOSINOPHIL NFR BLD AUTO: 5 % (ref 0–6)
ERYTHROCYTE [DISTWIDTH] IN BLOOD BY AUTOMATED COUNT: 14.5 % (ref 11–14.5)
ERYTHROCYTE [SEDIMENTATION RATE] IN BLOOD BY WESTERGREN METHOD: 111 MM/HR (ref 0–20)
FOLATE SERPL-MCNC: 6.5 NG/ML
GFR SERPL CREATININE-BSD FRML MDRD: >60 ML/MIN/1.73M2
HCT VFR BLD AUTO: 30 % (ref 35–47)
HGB BLD-MCNC: 9.2 G/DL (ref 12–16)
LYMPHOCYTES # BLD AUTO: 1.1 THOU/UL (ref 0.8–4.4)
LYMPHOCYTES NFR BLD AUTO: 26 % (ref 20–40)
MCH RBC QN AUTO: 30.2 PG (ref 27–34)
MCHC RBC AUTO-ENTMCNC: 30.7 G/DL (ref 32–36)
MCV RBC AUTO: 98 FL (ref 80–100)
MONOCYTES # BLD AUTO: 0.4 THOU/UL (ref 0–0.9)
MONOCYTES NFR BLD AUTO: 10 % (ref 2–10)
NEUTROPHILS # BLD AUTO: 2.4 THOU/UL (ref 2–7.7)
NEUTROPHILS NFR BLD AUTO: 58 % (ref 50–70)
PLATELET # BLD AUTO: 324 THOU/UL (ref 140–440)
PMV BLD AUTO: 8.9 FL (ref 8.5–12.5)
RBC # BLD AUTO: 3.05 MILL/UL (ref 3.8–5.4)
VIT B12 SERPL-MCNC: 641 PG/ML (ref 213–816)
WBC: 4.1 THOU/UL (ref 4–11)

## 2019-01-02 ENCOUNTER — MEDICAL CORRESPONDENCE (OUTPATIENT)
Dept: HEALTH INFORMATION MANAGEMENT | Facility: CLINIC | Age: 54
End: 2019-01-02

## 2019-01-03 ENCOUNTER — OFFICE VISIT - HEALTHEAST (OUTPATIENT)
Dept: GERIATRICS | Facility: CLINIC | Age: 54
End: 2019-01-03

## 2019-01-03 DIAGNOSIS — F32.A DEPRESSION, UNSPECIFIED DEPRESSION TYPE: ICD-10-CM

## 2019-01-03 DIAGNOSIS — Z98.1 STATUS POST CERVICAL SPINAL FUSION: ICD-10-CM

## 2019-01-03 DIAGNOSIS — Z98.84 HISTORY OF GASTRIC BYPASS: ICD-10-CM

## 2019-01-03 DIAGNOSIS — F41.9 ANXIETY: ICD-10-CM

## 2019-01-03 DIAGNOSIS — G06.2 EPIDURAL ABSCESS: ICD-10-CM

## 2019-01-04 ENCOUNTER — TELEPHONE (OUTPATIENT)
Dept: FAMILY MEDICINE | Facility: CLINIC | Age: 54
End: 2019-01-04

## 2019-01-04 NOTE — TELEPHONE ENCOUNTER
Diagnosis: dental caries. Procedure: dental restorations (6 crowns, 4 fillings). Patient to be discharged home with parents once patient meets discharge criteria for out-patient general anesthesia. Normal diet and activity today. Follow up in 2 weeks at office. Odalis Calderon DDS. [phone]   Haven't seen patient since 9/15/2017.  OK to follow for home care?    Lizzeth Alston RN

## 2019-01-05 ENCOUNTER — TRANSFERRED RECORDS (OUTPATIENT)
Dept: HEALTH INFORMATION MANAGEMENT | Facility: CLINIC | Age: 54
End: 2019-01-05

## 2019-01-07 ENCOUNTER — AMBULATORY - HEALTHEAST (OUTPATIENT)
Dept: GERIATRICS | Facility: CLINIC | Age: 54
End: 2019-01-07

## 2019-01-07 ENCOUNTER — OFFICE VISIT (OUTPATIENT)
Dept: FAMILY MEDICINE | Facility: CLINIC | Age: 54
End: 2019-01-07
Payer: COMMERCIAL

## 2019-01-07 VITALS
RESPIRATION RATE: 16 BRPM | HEART RATE: 82 BPM | HEIGHT: 63 IN | DIASTOLIC BLOOD PRESSURE: 88 MMHG | OXYGEN SATURATION: 93 % | BODY MASS INDEX: 33.1 KG/M2 | TEMPERATURE: 97.1 F | WEIGHT: 186.8 LBS | SYSTOLIC BLOOD PRESSURE: 144 MMHG

## 2019-01-07 DIAGNOSIS — M54.12 CERVICAL RADICULOPATHY: ICD-10-CM

## 2019-01-07 DIAGNOSIS — Z80.0 FAMILY HISTORY OF COLON CANCER: ICD-10-CM

## 2019-01-07 DIAGNOSIS — Z12.11 SCREEN FOR COLON CANCER: ICD-10-CM

## 2019-01-07 DIAGNOSIS — G06.2 EPIDURAL ABSCESS: ICD-10-CM

## 2019-01-07 DIAGNOSIS — E78.5 HYPERLIPIDEMIA LDL GOAL <160: ICD-10-CM

## 2019-01-07 DIAGNOSIS — Z12.31 VISIT FOR SCREENING MAMMOGRAM: ICD-10-CM

## 2019-01-07 DIAGNOSIS — Z29.9 PREVENTIVE MEASURE: Primary | ICD-10-CM

## 2019-01-07 PROCEDURE — 99214 OFFICE O/P EST MOD 30 MIN: CPT | Performed by: FAMILY MEDICINE

## 2019-01-07 RX ORDER — CLONAZEPAM 0.5 MG/1
TABLET ORAL
COMMUNITY
Start: 2018-12-24

## 2019-01-07 RX ORDER — CEFAZOLIN SODIUM 1 G/3ML
2 INJECTION, POWDER, FOR SOLUTION INTRAMUSCULAR; INTRAVENOUS
COMMUNITY
Start: 2019-01-02 | End: 2019-02-04

## 2019-01-07 RX ORDER — PREDNISONE 20 MG/1
TABLET ORAL
COMMUNITY
Start: 2019-01-05 | End: 2019-02-04

## 2019-01-07 RX ORDER — OXYCODONE HYDROCHLORIDE 5 MG/1
5-10 TABLET ORAL
COMMUNITY
Start: 2018-12-24 | End: 2019-02-04

## 2019-01-07 RX ORDER — SENNA AND DOCUSATE SODIUM 50; 8.6 MG/1; MG/1
3 TABLET, FILM COATED ORAL
COMMUNITY
Start: 2018-12-11

## 2019-01-07 RX ORDER — NAFCILLIN 1 G/50ML
2 INJECTION, SOLUTION INTRAVENOUS
COMMUNITY
Start: 2018-12-24 | End: 2019-02-04

## 2019-01-07 RX ORDER — METHOCARBAMOL 500 MG/1
TABLET, FILM COATED ORAL
COMMUNITY
Start: 2018-12-24 | End: 2019-02-04

## 2019-01-07 RX ORDER — POLYETHYLENE GLYCOL 3350 17 G/17G
17 POWDER, FOR SOLUTION ORAL
COMMUNITY
Start: 2018-12-12 | End: 2019-02-04

## 2019-01-07 RX ORDER — VENLAFAXINE HYDROCHLORIDE 225 MG/1
225 TABLET, EXTENDED RELEASE ORAL
COMMUNITY
End: 2019-02-04

## 2019-01-07 RX ORDER — HYDROXYZINE PAMOATE 50 MG/1
50 CAPSULE ORAL
COMMUNITY
End: 2019-02-13

## 2019-01-07 ASSESSMENT — PATIENT HEALTH QUESTIONNAIRE - PHQ9: SUM OF ALL RESPONSES TO PHQ QUESTIONS 1-9: 7

## 2019-01-07 ASSESSMENT — MIFFLIN-ST. JEOR: SCORE: 1419.45

## 2019-01-07 NOTE — PROGRESS NOTES
SUBJECTIVE:   Toi Mg is a 53 year old female who presents to clinic today for the following health issues:            Hospital Follow-up Visit:  Per [facility] Notes     Hospital/Nursing Home/ Rehab Facility: [facility]   Date of Admission: Patient first went in on 12/1/2018 for neck and shoulder pain, went back on 12/5/2018-12/12 for spinal epidural abscess, panic and constipation. Patient then had surgery on 12/7/2018 (CERVICAL 7- THORACIC 1 ANTERIOR CERVICAL DISCECTOMY WITH FUSION) then was in TCU on 12/13 and 12/17, then had surgery again on 12/20/2018 (ANTERIOR CERVICAL 5- CERVICAL 6 DISCECTOMY AND FUSION) .  Diskitis osteomyelitis with spinal epidural abscess    MSSA bacteremia with epidural abscess    Principal Problem:  Epidural abscess  Active Problems:  History of gastric bypass  Depression  Anxiety  Cervical radiculopathy  Acute postoperative pain  Acute neck pain               Date of Discharge: Discharge from TCU on Saturday 1/5/2019  Reason(s) for Admission: Neck and shoulder pain             Problems taking medications regularly:  None       Medication changes since discharge: Added Prednisone        Problems adhering to non-medication therapy:  None    Summary of hospitalization:  CareEverywhere information obtained and reviewed  Diagnostic Tests/Treatments reviewed.  Follow up needed: Infectious Disease and Infectious Disease  Pt has a PIIC line and gets antibiotics  No fever or chills  Other Healthcare Providers Involved in Patient s Care:         as above and dulce maria has Home care  Update since discharge: improved.     Post Discharge Medication Reconciliation: discharge medications reconciled, continue medications without change.  Plan of care communicated with patient     Coding guidelines for this visit:  Type of Medical   Decision Making Face-to-Face Visit       within 7 Days of discharge Face-to-Face Visit        within 14 days of discharge   Moderate Complexity [id number]    High Complexity [id number]              Problem list and histories reviewed & adjusted, as indicated.  Additional history: as documented    Patient Active Problem List   Diagnosis     Migraines     Back pain     Vitamin B12 deficiency without anemia     Low iron     Status post gastric bypass for obesity     Moderate major depression (H)     Anxiety     Insomnia     Migraine     Hyperlipidemia LDL goal <160     Family history of colon cancer     Hypothyroidism     Non morbid obesity due to excess calories     Family history of colonic polyps     Vitamin D deficiency     Past Surgical History:   Procedure Laterality Date     C  DELIVERY ONLY  1988     GASTRIC BYPASS  2002       Social History     Tobacco Use     Smoking status: Never Smoker     Smokeless tobacco: Never Used   Substance Use Topics     Alcohol use: No     Family History   Problem Relation Age of Onset     Heart Disease Mother         chf d age 67 from Throat ca     Respiratory Mother      Arthritis Mother      Cancer Mother         Throat     Cancer Father         d of Prostate ca d age 65     Prostate Cancer Father      C.A.D. Father         cabg age 60     Cerebrovascular Disease Paternal Grandmother      Diabetes Brother      Asthma Son      Breast Cancer Cousin          Current Outpatient Medications   Medication Sig Dispense Refill     Aspirin-Acetaminophen-Caffeine (EXCEDRIN PO)        clonazePAM (KLONOPIN) 0.5 MG tablet clonazepam 0.5 mg tablet       ibuprofen 200 MG capsule Take 800 mg by mouth every 4 hours as needed for fever       multivitamin, therapeutic with minerals (MULTI-VITAMIN) TABS tablet Take 1 tablet by mouth daily       predniSONE (DELTASONE) 20 MG tablet prednisone 20 mg tablet       venlafaxine (EFFEXOR-XR) 150 MG 24 hr capsule Take 1 capsule (150 mg) by mouth daily 30 capsule 3     Ferrous Sulfate (IRON SUPPLEMENT PO) Take 65 mg by mouth daily       Allergies   Allergen Reactions     Barbiturates Unknown      "Ethanol Unknown     Gantrisin [Ethanol]      Pyridium [Phenazopyridine] Rash     Sulfisoxazole Rash     Recent Labs   Lab Test 12/11/18 09/15/17  1121 08/10/16  0925 06/26/15  1107 07/02/14  0807  07/26/12  1145   LDL  --  128*  --  101 104   < >  --    HDL  --  95  --  69 61   < >  --    TRIG  --  114  --  170* 115   < >  --    ALT 20  --   --   --   --   --  26   CR 0.68  --   --  0.70  --   --  0.60   GFRESTIMATED >60  --   --  88  --   --  >90   GFRESTBLACK >60  --   --  >90  African American GFR Calc    --   --  >90   POTASSIUM 3.7  --   --  4.0  --   --   --    TSH  --  6.29* 4.43* 5.61*  --   --   --     < > = values in this interval not displayed.      BP Readings from Last 3 Encounters:   01/07/19 144/88   09/15/17 130/86   06/13/17 122/80    Wt Readings from Last 3 Encounters:   01/07/19 84.7 kg (186 lb 12.8 oz)   09/15/17 89 kg (196 lb 3.2 oz)   06/13/17 87.5 kg (193 lb)                  Labs reviewed in EPIC    Reviewed and updated as needed this visit by clinical staff       Reviewed and updated as needed this visit by Provider         ROS:  CONSTITUTIONAL: NEGATIVE for fever, chills, change in weight  INTEGUMENTARY/SKIN: NEGATIVE for worrisome rashes, moles or lesions  EYES: NEGATIVE for vision changes or irritation  ENT/MOUTH: NEGATIVE for ear, mouth and throat problems  RESP: NEGATIVE for significant cough or SOB  CV: NEGATIVE for chest pain, palpitations or peripheral edema  GI: NEGATIVE for nausea, abdominal pain, heartburn, or change in bowel habits   female: none  MUSCULOSKELETAL:none  NEURO: NEGATIVE for weakness, dizziness or paresthesias  PSYCHIATRIC: NEGATIVE for changes in mood or affect    OBJECTIVE:     /88   Pulse 82   Temp 97.1  F (36.2  C) (Oral)   Resp 16   Ht 1.605 m (5' 3.19\")   Wt 84.7 kg (186 lb 12.8 oz)   SpO2 93%   BMI 32.89 kg/m    Body mass index is 32.89 kg/m .  GENERAL: healthy, alert and no distress  NECK: no adenopathy, no asymmetry, masses, or scars and " thyroid normal to palpation  NECK: incision on neck healing well  RESP: lungs clear to auscultation - no rales, rhonchi or wheezes  CV: regular rate and rhythm, normal S1 S2, no S3 or S4, no murmur, click or rub, no peripheral edema and peripheral pulses strong  ABDOMEN: soft, nontender, no hepatosplenomegaly, no masses and bowel sounds normal  MS: no gross musculoskeletal defects noted, no edema      Diagnostic Test Results:  none     ASSESSMENT/PLAN:         1. Epidural abscess  Doing better-Pt is on antibiotics Thru PIIC line  Has appointment to see ID    2. Cervical radiculopathy      3. Family history of colon cancer  Advised colonoscopy when better    4. Hyperlipidemia LDL goal <160  Pt will come fasting for labs    5. Screen for colon cancer  As above    6. Preventive measure  As above  - Lipid panel reflex to direct LDL Fasting; Future  - TSH WITH FREE T4 REFLEX; Future    7. Visit for screening mammogram  Advised   - MA SCREENING DIGITAL BILAT - Future  (s+30); Future    Follow up 6 weeks    Lashonda Sunshine MD  [facility]

## 2019-01-07 NOTE — TELEPHONE ENCOUNTER
Left detailed message for Laurie from [facility] with information below.   Advised to call back RN Hotline [phone] if any questions.     Lizzeth Alston RN

## 2019-01-07 NOTE — PATIENT INSTRUCTIONS
Please call Pt she should also get colonoscopy due to her Family history as discussed in the past  Please schedule colonoscopy when better  It is important for her to do this also due to her family history  Please follow up alexander BP check

## 2019-01-11 ENCOUNTER — TELEPHONE (OUTPATIENT)
Dept: FAMILY MEDICINE | Facility: CLINIC | Age: 54
End: 2019-01-11

## 2019-01-11 DIAGNOSIS — M54.5 LOW BACK PAIN, UNSPECIFIED BACK PAIN LATERALITY, UNSPECIFIED CHRONICITY, WITH SCIATICA PRESENCE UNSPECIFIED: ICD-10-CM

## 2019-01-11 DIAGNOSIS — G06.1 SPINAL EPIDURAL ABSCESS: Primary | ICD-10-CM

## 2019-01-11 NOTE — TELEPHONE ENCOUNTER
Reason for call:  Med question/request  Patient called regarding (reason for call):Patient was in [facility] and transitional care unit after having surgery and was prescribed some prescriptions. They need to be renewed and refilled. She is wondering if Dr Sunshine can fill them?  Additional comments: Please call patient to discus further    Phone number to reach patient:[phone]  Best Time:anytime    Can we leave a detailed message on this number?  YES

## 2019-01-14 RX ORDER — METHOCARBAMOL 500 MG/1
1000 TABLET, FILM COATED ORAL 4 TIMES DAILY PRN
Status: CANCELLED | OUTPATIENT
Start: 2019-01-14 | End: 2019-01-24

## 2019-01-14 RX ORDER — METHOCARBAMOL 750 MG/1
750 TABLET, FILM COATED ORAL 4 TIMES DAILY PRN
Qty: 30 TABLET | Refills: 1 | Status: SHIPPED | OUTPATIENT
Start: 2019-01-14 | End: 2019-02-04

## 2019-01-14 NOTE — TELEPHONE ENCOUNTER
Please call   Okay refill Robaxin  She should get Oxycodone from her specialist as they are Treating her and I am not in clinic  Please send this to her Infectious disease

## 2019-01-15 ENCOUNTER — MEDICAL CORRESPONDENCE (OUTPATIENT)
Dept: HEALTH INFORMATION MANAGEMENT | Facility: CLINIC | Age: 54
End: 2019-01-15

## 2019-01-16 NOTE — TELEPHONE ENCOUNTER
Called and spoke with patient.   Gave information below.   Robaxin sent to pharmacy.     Lizzeth Alston RN

## 2019-01-21 ENCOUNTER — TELEPHONE (OUTPATIENT)
Dept: FAMILY MEDICINE | Facility: CLINIC | Age: 54
End: 2019-01-21

## 2019-01-21 NOTE — TELEPHONE ENCOUNTER
Called and spoke with patient.   Reports that she was prescribed Nascobal nose spray when she was in the TCU and advised to f/u with PCP on all medications.   Last B12 lab result was 12/31/2018, result of 641.  States she has a hx of getting B12 injections but hasn't for awhile now.     Patient wondering if she should still be taking this medication, if so she'll need a new Rx.     Please advise.     Lizzeth Alston RN

## 2019-01-21 NOTE — TELEPHONE ENCOUNTER
prescribed Nascbal Spray  Last Written Prescription Date:  n/a  Last Fill Quantity: n/a,   # refills: n/a  Last Office Visit: n/a  Future Office visit:       Routing refill request to provider for review/approval because:  Patient request per message below

## 2019-01-21 NOTE — TELEPHONE ENCOUNTER
Reason for call:  Other   Patient called regarding (reason for call): call back  Additional comments: Patient is calling because she was prescribed Nascbal [place] in the hospital and she is wondering if she can get a refill through Dr Sunshine. Please call back    Phone number to reach patient:  Home number on file [phone] (home)    Best Time:  any    Can we leave a detailed message on this number?  YES

## 2019-01-23 ENCOUNTER — TRANSFERRED RECORDS (OUTPATIENT)
Dept: HEALTH INFORMATION MANAGEMENT | Facility: CLINIC | Age: 54
End: 2019-01-23

## 2019-01-25 NOTE — TELEPHONE ENCOUNTER
Patient notified of providers message as written.  Patient does not have prescription for B12 but reports she was using Nascobal spray 500mcg once weekly. She will f/u in 3 months.    Nella Vazquez RN

## 2019-01-25 NOTE — TELEPHONE ENCOUNTER
Left message on answering machine for patient to call back to the RN hotline at [phone].    Karyn Johnson RN  [facility]

## 2019-01-28 ENCOUNTER — MEDICAL CORRESPONDENCE (OUTPATIENT)
Dept: HEALTH INFORMATION MANAGEMENT | Facility: CLINIC | Age: 54
End: 2019-01-28

## 2019-02-04 ENCOUNTER — OFFICE VISIT (OUTPATIENT)
Dept: FAMILY MEDICINE | Facility: CLINIC | Age: 54
End: 2019-02-04
Payer: COMMERCIAL

## 2019-02-04 ENCOUNTER — TRANSFERRED RECORDS (OUTPATIENT)
Dept: HEALTH INFORMATION MANAGEMENT | Facility: CLINIC | Age: 54
End: 2019-02-04

## 2019-02-04 VITALS
HEIGHT: 63 IN | RESPIRATION RATE: 14 BRPM | WEIGHT: 183.6 LBS | SYSTOLIC BLOOD PRESSURE: 160 MMHG | DIASTOLIC BLOOD PRESSURE: 98 MMHG | BODY MASS INDEX: 32.53 KG/M2 | TEMPERATURE: 99.2 F | OXYGEN SATURATION: 100 % | HEART RATE: 110 BPM

## 2019-02-04 DIAGNOSIS — R51.9 NONINTRACTABLE HEADACHE, UNSPECIFIED CHRONICITY PATTERN, UNSPECIFIED HEADACHE TYPE: ICD-10-CM

## 2019-02-04 DIAGNOSIS — I10 HYPERTENSION GOAL BP (BLOOD PRESSURE) < 140/90: Primary | ICD-10-CM

## 2019-02-04 PROCEDURE — 99214 OFFICE O/P EST MOD 30 MIN: CPT | Performed by: FAMILY MEDICINE

## 2019-02-04 RX ORDER — ATENOLOL AND CHLORTHALIDONE TABLET 50; 25 MG/1; MG/1
TABLET ORAL
Qty: 30 TABLET | Refills: 1 | Status: SHIPPED | OUTPATIENT
Start: 2019-02-04 | End: 2019-02-13 | Stop reason: DRUGHIGH

## 2019-02-04 RX ORDER — TRAMADOL HYDROCHLORIDE 50 MG/1
TABLET ORAL
COMMUNITY
Start: 2019-01-18

## 2019-02-04 RX ORDER — CYCLOBENZAPRINE HCL 5 MG
5 TABLET ORAL PRN
COMMUNITY
Start: 2019-01-21

## 2019-02-04 RX ORDER — DAPTOMYCIN 50 MG/ML
500 INJECTION, POWDER, LYOPHILIZED, FOR SOLUTION INTRAVENOUS
COMMUNITY
Start: 2019-02-01 | End: 2019-02-18

## 2019-02-04 ASSESSMENT — MIFFLIN-ST. JEOR: SCORE: 1405.89

## 2019-02-04 NOTE — NURSING NOTE
"Chief Complaint   Patient presents with     Hypertension     Initial /64 (BP Location: Left arm, Patient Position: Chair, Cuff Size: Adult Regular)   Pulse 110   Temp 99.2  F (37.3  C) (Oral)   Resp 14   Ht 1.607 m (5' 3.25\")   Wt 83.3 kg (183 lb 9.6 oz)   SpO2 100%   Breastfeeding? No   BMI 32.27 kg/m   Estimated body mass index is 32.27 kg/m  as calculated from the following:    Height as of this encounter: 1.607 m (5' 3.25\").    Weight as of this encounter: 83.3 kg (183 lb 9.6 oz).  BP completed using cuff size: hiram Chaparro  "

## 2019-02-04 NOTE — PROGRESS NOTES
SUBJECTIVE:   Toi Mg is a 53 year old female who presents to clinic today for the following health issues:    Hypertension Follow-up    Patient was diagnosed with epidural abscess in 12/2019 and in on IV antibiotics.     BP checks with the home care nurse and at infectious disease office have been very high and recently advised to visit the ER for the same. She also does have headaches; she describes them as tension headaches because has been under a lot of stress. She does have a history of migraines though this feelsa little different.   She also does feel jittery a lot.  Denies any chest pain, leg swelling  Or SOB.   She also has a family history of Hypertension.      S/P Epidural Abscess:     Has a picc line and getting IV antibiotics.    Also following with ID      Outpatient blood pressures are not being checked.    Low Salt Diet: no added salt    Had an epidural abscess: still on IV antibiotic infusion  LDL, TSH.   Mammo, Colon, Shingles.  BP Readings from Last 6 Encounters:   02/04/19 (!) 160/98   01/07/19 144/88   09/15/17 130/86   06/13/17 122/80   02/01/17 132/84   11/18/16 137/82       Amount of exercise or physical activity: None    Problems taking medications regularly: No    Medication side effects: none    Diet: regular (no restrictions) and low salt    Problem list and histories reviewed & adjusted, as indicated.  Additional history: as documented    Patient Active Problem List   Diagnosis     Migraines     Back pain     Vitamin B12 deficiency without anemia     Low iron     Status post gastric bypass for obesity     Moderate major depression (H)     Anxiety     Insomnia     Migraine     Hyperlipidemia LDL goal <160     Family history of colon cancer     Hypothyroidism     Non morbid obesity due to excess calories     Family history of colonic polyps     Vitamin D deficiency     Epidural abscess     Cervical radiculopathy     Past Surgical History:   Procedure Laterality Date     C  " DELIVERY ONLY       GASTRIC BYPASS  2002       Social History     Tobacco Use     Smoking status: Never Smoker     Smokeless tobacco: Never Used   Substance Use Topics     Alcohol use: No     Family History   Problem Relation Age of Onset     Heart Disease Mother         chf d age 67 from Throat ca     Respiratory Mother      Arthritis Mother      Cancer Mother         Throat     Cancer Father         d of Prostate ca d age 65     Prostate Cancer Father      C.A.D. Father         cabg age 60     Cerebrovascular Disease Paternal Grandmother      Diabetes Brother      Asthma Son      Breast Cancer Cousin            Reviewed and updated as needed this visit by clinical staff  Tobacco  Allergies  Meds  Med Hx  Surg Hx  Fam Hx  Soc Hx      Reviewed and updated as needed this visit by Provider  Allergies       ROS:  HEENT, cardiovascular, pulmonary, gi and gu systems are negative, except as otherwise noted.    OBJECTIVE:     BP (!) 160/98   Pulse 110   Temp 99.2  F (37.3  C) (Oral)   Resp 14   Ht 1.607 m (5' 3.25\")   Wt 83.3 kg (183 lb 9.6 oz)   SpO2 100%   Breastfeeding? No   BMI 32.27 kg/m    Body mass index is 32.27 kg/m .  GENERAL: healthy, alert and mild distress  CRANIAL NERVES: Intact  NECK: no adenopathy and thyroid normal to palpation  RESP: lungs clear to auscultation - no rales, rhonchi or wheezes  CV: regular rate and rhythm, normal S1 S2, no S3 or S4, no murmur, click or rub  ABDOMEN: soft, nontender, no masses and bowel sounds normal  MS: no gross musculoskeletal defects noted, no edema    Diagnostic Test Results:    ASSESSMENT/PLAN:     (I10) Hypertension goal BP (blood pressure) < 140/90  (primary encounter diagnosis)  Comment: BP not at goal. Discussed sodium restriction, maintaining ideal body weight and regular exercise program as physiologic means to achieve blood pressure control. Given the high pressure will start a combo medication. Side effects explained in detail " and will call with any concerns.     Plan: atenolol-chlorthalidone (TENORETIC) 50-25 MG         tablet, Basic metabolic panel    (R51) Nonintractable headache, unspecified chronicity pattern, unspecified headache type  Comment: Likely from uncontrolled BP. Will also check UA  Plan: UA reflex to Microscopic and Culture    Follow up in 1 week or sooner with concerns    Harpal Sahu MD  [facility]

## 2019-02-13 ENCOUNTER — OFFICE VISIT (OUTPATIENT)
Dept: FAMILY MEDICINE | Facility: CLINIC | Age: 54
End: 2019-02-13
Payer: COMMERCIAL

## 2019-02-13 VITALS
HEIGHT: 63 IN | HEART RATE: 79 BPM | DIASTOLIC BLOOD PRESSURE: 90 MMHG | BODY MASS INDEX: 31.89 KG/M2 | RESPIRATION RATE: 14 BRPM | SYSTOLIC BLOOD PRESSURE: 130 MMHG | TEMPERATURE: 97.1 F | WEIGHT: 180 LBS | OXYGEN SATURATION: 99 %

## 2019-02-13 DIAGNOSIS — Z12.31 VISIT FOR SCREENING MAMMOGRAM: ICD-10-CM

## 2019-02-13 DIAGNOSIS — G06.2 EPIDURAL ABSCESS: Primary | ICD-10-CM

## 2019-02-13 DIAGNOSIS — E78.5 HYPERLIPIDEMIA LDL GOAL <100: ICD-10-CM

## 2019-02-13 DIAGNOSIS — I10 HTN, GOAL BELOW 140/90: ICD-10-CM

## 2019-02-13 DIAGNOSIS — Z12.11 SCREEN FOR COLON CANCER: ICD-10-CM

## 2019-02-13 LAB
ANION GAP SERPL CALCULATED.3IONS-SCNC: 11 MMOL/L (ref 3–14)
BUN SERPL-MCNC: 32 MG/DL (ref 7–30)
CALCIUM SERPL-MCNC: 9.3 MG/DL (ref 8.5–10.1)
CHLORIDE SERPL-SCNC: 103 MMOL/L (ref 94–109)
CHOLEST SERPL-MCNC: 275 MG/DL
CO2 SERPL-SCNC: 24 MMOL/L (ref 20–32)
CREAT SERPL-MCNC: 1.07 MG/DL (ref 0.52–1.04)
GFR SERPL CREATININE-BSD FRML MDRD: 58 ML/MIN/{1.73_M2}
GLUCOSE SERPL-MCNC: 107 MG/DL (ref 70–99)
HDLC SERPL-MCNC: 72 MG/DL
LDLC SERPL CALC-MCNC: 164 MG/DL
NONHDLC SERPL-MCNC: 203 MG/DL
POTASSIUM SERPL-SCNC: 4.5 MMOL/L (ref 3.4–5.3)
SODIUM SERPL-SCNC: 138 MMOL/L (ref 133–144)
TRIGL SERPL-MCNC: 197 MG/DL
TSH SERPL DL<=0.005 MIU/L-ACNC: 3.63 MU/L (ref 0.4–4)

## 2019-02-13 PROCEDURE — 36415 COLL VENOUS BLD VENIPUNCTURE: CPT | Performed by: FAMILY MEDICINE

## 2019-02-13 PROCEDURE — 80048 BASIC METABOLIC PNL TOTAL CA: CPT | Performed by: FAMILY MEDICINE

## 2019-02-13 PROCEDURE — 99214 OFFICE O/P EST MOD 30 MIN: CPT | Performed by: FAMILY MEDICINE

## 2019-02-13 PROCEDURE — 84443 ASSAY THYROID STIM HORMONE: CPT | Performed by: FAMILY MEDICINE

## 2019-02-13 PROCEDURE — 80061 LIPID PANEL: CPT | Performed by: FAMILY MEDICINE

## 2019-02-13 RX ORDER — ATENOLOL AND CHLORTHALIDONE TABLET 100; 25 MG/1; MG/1
1 TABLET ORAL DAILY
Qty: 30 TABLET | Refills: 3 | Status: SHIPPED | OUTPATIENT
Start: 2019-02-13 | End: 2019-06-23

## 2019-02-13 RX ORDER — ACETAMINOPHEN 500 MG
500-1000 TABLET ORAL EVERY 6 HOURS PRN
COMMUNITY

## 2019-02-13 ASSESSMENT — MIFFLIN-ST. JEOR: SCORE: 1389.56

## 2019-02-13 ASSESSMENT — PATIENT HEALTH QUESTIONNAIRE - PHQ9: SUM OF ALL RESPONSES TO PHQ QUESTIONS 1-9: 12

## 2019-02-13 NOTE — PROGRESS NOTES
SUBJECTIVE:   Toi Mg is a 53 year old female who presents to clinic today for the following health issues:    Epidural abscess:   Has been on different antibiotics now on Daptomycin till 2019 and generally feeling better though continues to experience back pain.    Hypertension Follow-up      Outpatient blood pressures are not being checked.    Low Salt Diet: low salt      Amount of exercise or physical activity: None    Problems taking medications regularly: No    Medication side effects: nausea    Diet: low salt    BP Readings from Last 6 Encounters:   19 130/90   19 (!) 160/98   19 144/88   09/15/17 130/86   17 122/80   17 132/84         Problem list and histories reviewed & adjusted, as indicated.  Additional history: as documented    Patient Active Problem List   Diagnosis     Migraines     Back pain     Vitamin B12 deficiency without anemia     Low iron     Status post gastric bypass for obesity     Moderate major depression (H)     Anxiety     Insomnia     Migraine     Hyperlipidemia LDL goal <160     Family history of colon cancer     Hypothyroidism     Non morbid obesity due to excess calories     Family history of colonic polyps     Vitamin D deficiency     Epidural abscess     Cervical radiculopathy     Past Surgical History:   Procedure Laterality Date     C  DELIVERY ONLY  1988     GASTRIC BYPASS  2002       Social History     Tobacco Use     Smoking status: Never Smoker     Smokeless tobacco: Never Used   Substance Use Topics     Alcohol use: No     Family History   Problem Relation Age of Onset     Heart Disease Mother         chf d age 67 from Throat ca     Respiratory Mother      Arthritis Mother      Cancer Mother         Throat     Cancer Father         d of Prostate ca d age 65     Prostate Cancer Father      C.A.D. Father         cabg age 60     Cerebrovascular Disease Paternal Grandmother      Diabetes Brother      Asthma Son       "Breast Cancer Cousin            Reviewed and updated as needed this visit by Provider    ROS:  Constitutional, HEENT, cardiovascular, pulmonary, gi and gu systems are negative, except as otherwise noted.    OBJECTIVE:     /90   Pulse 79   Temp 97.1  F (36.2  C) (Oral)   Resp 14   Ht 1.607 m (5' 3.25\")   Wt 81.6 kg (180 lb)   SpO2 99%   Breastfeeding? No   BMI 31.63 kg/m    Body mass index is 31.63 kg/m .  GENERAL: healthy, alert and no distress  NECK: no adenopathy and thyroid normal to palpation  RESP: lungs clear to auscultation - no rales, rhonchi or wheezes  CV: regular rate and rhythm, no murmur, click or rub, no peripheral edema   ABDOMEN: soft, nontender, no masses and bowel sounds normal  MS: no gross musculoskeletal defects noted, no edema    Diagnostic Test Results:    ASSESSMENT/PLAN:     (G06.2) Epidural abscess  (primary encounter diagnosis)  Comment: Following with ID and on IV antibiotic.    (I10) HTN, goal below 140/90  Comment: BP borderline high. Discussed increasing dose of atenolol to 100 mg from 50 mg. Recheck BP in 1 week.  Plan: Basic metabolic panel  (Ca, Cl, CO2, Creat,         Gluc, K, Na, BUN), atenolol-chlorthalidone         (TENORETIC) 100-25 MG tablet    (E78.5) Hyperlipidemia LDL goal <100  Comment: Check LDL and TSH  Plan: TSH WITH FREE T4 REFLEX, Lipid Profile (Chol,         Trig, HDL, LDL calc)    (Z12.11) Screen for colon cancer  (Z12.31) Visit for screening mammogram  Comment: Deferred until feeling better    Follow up in 1 month or sooner with concerns    Harpal Sahu MD  [facility]    "

## 2019-02-13 NOTE — LETTER
[facility]  [address] [place] [address]    February 19, 2019    Toi Mg  [address]    [facility] [address]          Dear Toi,  Your results show above desirable cholesterol levels; the 10-year ASCVD risk score (Mininabor ROSS Jr., et al., 2013) is: 2.7%  (that is your risk of heart disease in the next 10 years, and this is significant if the risk in above 7.5%). The recommended interventions include healthy diet, regular exercise, maintaining an ideal weight and rechecking in 3-6 months. The kidney function is at baseline. TSH is normal.  Results for orders placed or performed in visit on 02/13/19   TSH WITH FREE T4 REFLEX   Result Value Ref Range    TSH 3.63 0.40 - 4.00 mU/L   Lipid Profile (Chol, Trig, HDL, LDL calc)   Result Value Ref Range    Cholesterol 275 (H) <200 mg/dL    Triglycerides 197 (H) <150 mg/dL    HDL Cholesterol 72 >49 mg/dL    LDL Cholesterol Calculated 164 (H) <100 mg/dL    Non HDL Cholesterol 203 (H) <130 mg/dL   Basic metabolic panel  (Ca, Cl, CO2, Creat, Gluc, K, Na, BUN)   Result Value Ref Range    Sodium 138 133 - 144 mmol/L    Potassium 4.5 3.4 - 5.3 mmol/L    Chloride 103 94 - 109 mmol/L    Carbon Dioxide 24 20 - 32 mmol/L    Anion Gap 11 3 - 14 mmol/L    Glucose 107 (H) 70 - 99 mg/dL    Urea Nitrogen 32 (H) 7 - 30 mg/dL    Creatinine 1.07 (H) 0.52 - 1.04 mg/dL    GFR Estimate 58 (L) >60 mL/min/[1.73_m2]    GFR Estimate If Black 68 >60 mL/min/[1.73_m2]    Calcium 9.3 8.5 - 10.1 mg/dL       If you have any questions or concerns, please me or my clinic team at [phone].      Sincerely,        Harpal Sahu MD/bt

## 2019-02-27 ENCOUNTER — TRANSFERRED RECORDS (OUTPATIENT)
Dept: HEALTH INFORMATION MANAGEMENT | Facility: CLINIC | Age: 54
End: 2019-02-27

## 2019-03-19 NOTE — TELEPHONE ENCOUNTER
cyclobenzaprine (FLEXERIL) 5 MG tablet    Last Written Prescription Date:    Last Fill Quantity: ,   # refills:   Last Office Visit: 02/13/2019  Future Office visit:       Routing refill request to provider for review/approval because:  Medication is reported/historical

## 2019-03-27 ENCOUNTER — MEDICAL CORRESPONDENCE (OUTPATIENT)
Dept: HEALTH INFORMATION MANAGEMENT | Facility: CLINIC | Age: 54
End: 2019-03-27

## 2019-04-09 ENCOUNTER — TELEPHONE (OUTPATIENT)
Dept: FAMILY MEDICINE | Facility: CLINIC | Age: 54
End: 2019-04-09

## 2019-04-09 NOTE — LETTER
April 9, 2019          Toi Mg,  [address]  [address]  [facility] [address]        Dear Toi Mg      Monitoring and managing your preventative and chronic health conditions are very important to us. Our records indicate that you have not scheduled for Appointment with your provider  which was recommended by Dr. Sahu for a Mammogram and Colonoscopy.      If you have received your health care elsewhere, please call the clinic so the information can be documented in your chart.    Please call [phone] or message us through your [employer] account to schedule an appointment or provide information for your chart.     Feel free to contact us if you have any questions or concerns!    I look forward to seeing you and working with you on your health care needs.     Sincerely,       Your [facility]/LS

## 2019-04-09 NOTE — TELEPHONE ENCOUNTER
Panel Management Review      Patient has the following on her problem list:     Depression / Dysthymia review    Measure:  Needs PHQ-9 score of 4 or less during index window.  Administer PHQ-9 and if score is 5 or more, send encounter to provider for next steps.    5 - 7 month window range: 8/13/2019    PHQ-9 SCORE 2/21/2018 1/7/2019 2/13/2019   PHQ-9 Total Score - - -   PHQ-9 Total Score 15 7 12       If PHQ-9 recheck is 5 or more, route to provider for next steps.    Patient is due for:  None      Composite cancer screening  Chart review shows that this patient is due/due soon for the following Mammogram and Colonoscopy  Summary:    Patient is due/failing the following:   COLONOSCOPY and MAMMOGRAM    Action needed:   Patient needs office visit for Mammo and Colonoscopy.    Type of outreach:    Sent letter.    Questions for provider review:    None                                                                                                                                    LS     Chart routed to none .

## 2019-04-16 RX ORDER — CYCLOBENZAPRINE HCL 5 MG
TABLET ORAL PRN
OUTPATIENT
Start: 2019-04-16

## 2019-04-22 NOTE — TELEPHONE ENCOUNTER
Spoke to patient and she is now with [facility]. Please disregard.  Karyn POWELL CMA ([facility])

## 2019-06-23 DIAGNOSIS — I10 HTN, GOAL BELOW 140/90: ICD-10-CM

## 2019-06-25 RX ORDER — ATENOLOL AND CHLORTHALIDONE TABLET 100; 25 MG/1; MG/1
TABLET ORAL
Qty: 30 TABLET | Refills: 0 | Status: SHIPPED | OUTPATIENT
Start: 2019-06-25

## 2019-06-25 NOTE — TELEPHONE ENCOUNTER
"Routing refill request to provider for review/approval because:  Elevated BP    Requested Prescriptions   Pending Prescriptions Disp Refills     atenolol-chlorthalidone (TENORETIC) 100-25 MG tablet [Pharmacy Med Name: atenolol 100 mg-chlorthalidone 25 mg tablet]  Last Written Prescription Date:  2/13/19  Last Fill Quantity: 30,  # refills: 3   Last office visit: 2/13/2019 with prescribing provider:     Future Office Visit:   30 tablet 3     Sig: Take 1 tablet by mouth once daily       Beta-Blockers Protocol Failed - 6/24/2019  6:39 AM        Failed - Blood pressure under 140/90 in past 12 months     BP Readings from Last 3 Encounters:   02/13/19 130/90   02/04/19 (!) 160/98   01/07/19 144/88                 Passed - Patient is age 6 or older        Passed - Recent (12 mo) or future (30 days) visit within the authorizing provider's specialty     Patient had office visit in the last 12 months or has a visit in the next 30 days with authorizing provider or within the authorizing provider's specialty.  See \"Patient Info\" tab in inbasket, or \"Choose Columns\" in Meds & Orders section of the refill encounter.              Passed - Medication is active on med list        Eli Trejo, [facility] - BC      "

## 2019-06-26 ENCOUNTER — TRANSFERRED RECORDS (OUTPATIENT)
Dept: HEALTH INFORMATION MANAGEMENT | Facility: CLINIC | Age: 54
End: 2019-06-26

## 2019-06-26 LAB — PHQ9 SCORE: 18

## 2019-07-22 ENCOUNTER — TELEPHONE (OUTPATIENT)
Dept: FAMILY MEDICINE | Facility: CLINIC | Age: 54
End: 2019-07-22

## 2019-07-24 NOTE — TELEPHONE ENCOUNTER
Panel Management Review      Patient has the following on her problem list:     Depression / Dysthymia review    Measure:  Needs PHQ-9 score of 4 or less during index window.  Administer PHQ-9 and if score is 5 or more, send encounter to provider for next steps.    5 - 7 month window range:     PHQ-9 SCORE 2/21/2018 1/7/2019 2/13/2019   PHQ-9 Total Score - - -   PHQ-9 Total Score 15 7 12       If PHQ-9 recheck is 5 or more, route to provider for next steps.    Patient is due for:  PHQ9      Composite cancer screening  Chart review shows that this patient is due/due soon for the following Mammogram and Colonoscopy  Summary:    Patient is due/failing the following:   COLONOSCOPY, MAMMOGRAM and PHQ9    Action needed:   Patient transferred care to [facility].PHQ-9 results sent to abstracting pool.     Type of outreach:    none    Questions for provider review:    None                                                                                                                                    Juliann Higgins MA       Chart routed to none .

## 2019-09-09 ENCOUNTER — TELEPHONE (OUTPATIENT)
Dept: FAMILY MEDICINE | Facility: CLINIC | Age: 54
End: 2019-09-09

## 2019-09-09 NOTE — LETTER
September 9, 2019          Toi Mg,  [address]  [address]  [facility] [address]        Your clinic record indicates that you are due for an Depression update. Please complete the Depression questionnaire and mail it back to us in the self-addressed stamped envelope.   ?   If you have questions about this letter please contact your provider.

## 2019-10-03 ENCOUNTER — TRANSFERRED RECORDS (OUTPATIENT)
Dept: HEALTH INFORMATION MANAGEMENT | Facility: CLINIC | Age: 54
End: 2019-10-03

## 2019-10-03 LAB — PHQ9 SCORE: 22

## 2019-10-17 ENCOUNTER — TELEPHONE (OUTPATIENT)
Dept: FAMILY MEDICINE | Facility: CLINIC | Age: 54
End: 2019-10-17

## 2019-10-29 ENCOUNTER — TELEPHONE (OUTPATIENT)
Dept: FAMILY MEDICINE | Facility: CLINIC | Age: 54
End: 2019-10-29

## 2019-10-29 NOTE — TELEPHONE ENCOUNTER
Patient transferred care. See care everywhere.  Panel Management Review      Patient has the following on her problem list: None      Composite cancer screening  Chart review shows that this patient is due/due soon for the following None  Summary:    Patient is due/failing the following:   none    Action needed:   none    Type of outreach:    none    Questions for provider review:    None                                                                                                                                    Juliann Higgins MA       Chart routed to none .

## 2019-12-09 ENCOUNTER — TELEPHONE (OUTPATIENT)
Dept: BEHAVIORAL HEALTH | Facility: CLINIC | Age: 54
End: 2019-12-09

## 2019-12-09 NOTE — TELEPHONE ENCOUNTER
Pt seeking 55+   [facility]   Has [facility]

## 2019-12-17 ENCOUNTER — HOSPITAL ENCOUNTER (OUTPATIENT)
Dept: BEHAVIORAL HEALTH | Facility: CLINIC | Age: 54
Discharge: HOME | End: 2019-12-17
Attending: PSYCHIATRY & NEUROLOGY | Admitting: PSYCHIATRY & NEUROLOGY
Payer: COMMERCIAL

## 2019-12-17 PROCEDURE — 90791 PSYCH DIAGNOSTIC EVALUATION: CPT | Performed by: PSYCHOLOGIST

## 2019-12-17 RX ORDER — HYDROXYZINE HYDROCHLORIDE 25 MG/1
25 TABLET, FILM COATED ORAL 3 TIMES DAILY PRN
COMMUNITY

## 2019-12-17 RX ORDER — GABAPENTIN 100 MG/1
100 CAPSULE ORAL 3 TIMES DAILY
COMMUNITY

## 2019-12-17 RX ORDER — ARIPIPRAZOLE 2 MG/1
2 TABLET ORAL DAILY
COMMUNITY

## 2019-12-17 RX ORDER — LOSARTAN POTASSIUM AND HYDROCHLOROTHIAZIDE 12.5; 5 MG/1; MG/1
1 TABLET ORAL DAILY
COMMUNITY

## 2019-12-17 ASSESSMENT — COLUMBIA-SUICIDE SEVERITY RATING SCALE - C-SSRS
6. HAVE YOU EVER DONE ANYTHING, STARTED TO DO ANYTHING, OR PREPARED TO DO ANYTHING TO END YOUR LIFE?: NO
4. HAVE YOU HAD THESE THOUGHTS AND HAD SOME INTENTION OF ACTING ON THEM?: NO
ATTEMPT LIFETIME: NO
5. HAVE YOU STARTED TO WORK OUT OR WORKED OUT THE DETAILS OF HOW TO KILL YOURSELF? DO YOU INTEND TO CARRY OUT THIS PLAN?: NO
TOTAL  NUMBER OF ABORTED OR SELF INTERRUPTED ATTEMPTS PAST 3 MONTHS: NO
3. HAVE YOU BEEN THINKING ABOUT HOW YOU MIGHT KILL YOURSELF?: YES
2. HAVE YOU ACTUALLY HAD ANY THOUGHTS OF KILLING YOURSELF LIFETIME?: NO
REASONS FOR IDEATION PAST MONTH: COMPLETELY TO END OR STOP THE PAIN (YOU COULDN'T GO ON LIVING WITH THE PAIN OR HOW YOU WERE FEELING)
2. HAVE YOU ACTUALLY HAD ANY THOUGHTS OF KILLING YOURSELF?: YES
1. IN THE PAST MONTH, HAVE YOU WISHED YOU WERE DEAD OR WISHED YOU COULD GO TO SLEEP AND NOT WAKE UP?: YES
5. HAVE YOU STARTED TO WORK OUT OR WORKED OUT THE DETAILS OF HOW TO KILL YOURSELF? DO YOU INTEND TO CARRY OUT THIS PLAN?: NO
TOTAL  NUMBER OF ABORTED OR SELF INTERRUPTED ATTEMPTS PAST LIFETIME: NO
4. HAVE YOU HAD THESE THOUGHTS AND HAD SOME INTENTION OF ACTING ON THEM?: NO
TOTAL  NUMBER OF INTERRUPTED ATTEMPTS PAST 3 MONTHS: NO
REASONS FOR IDEATION LIFETIME: COMPLETELY TO END OR STOP THE PAIN (YOU COULDN'T GO ON LIVING WITH THE PAIN OR HOW YOU WERE FEELING)
TOTAL  NUMBER OF INTERRUPTED ATTEMPTS LIFETIME: NO
ATTEMPT PAST THREE MONTHS: NO
6. HAVE YOU EVER DONE ANYTHING, STARTED TO DO ANYTHING, OR PREPARED TO DO ANYTHING TO END YOUR LIFE?: NO
1. IN THE PAST MONTH, HAVE YOU WISHED YOU WERE DEAD OR WISHED YOU COULD GO TO SLEEP AND NOT WAKE UP?: YES
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
## 2019-12-17 NOTE — PROGRESS NOTES
[facility] - Adult    MY COPING PLAN FOR SAFETY    PATIENT'S NAME: Toi Mg  MRN:   [id number]  SAFETY PLAN:  Step 1: Warning signs / cues (Thoughts, images, mood, situation, behavior) that a crisis may be developing:    Thoughts: intrusive thoughts of death    Thinking Processes: negative ruminating, just wants to be done with the stress    Mood: irritability    Behaviors: isolating/withdrawing     Situations: anniversary of parents's deaths   Step 2: Coping strategies - Things I can do to take my mind off of my problems without contacting another person (relaxation technique, physical activity):    Distress Tolerance Strategies:  pray    Physical Activities: deep breathing    Focus on helpful thoughts:  imagery   Step 3: People and social settings that provide distraction:   Name: brother    none current   Step 4: Remind myself of people and things that are important to me and worth living for:  Her son  Step 5: When I am in crisis, I can ask these people to help me use my safety plan:   Name: siblings and their spouses   Step 6: Making the environment safe:     be around others  Step 7: Professionals or agencies I can contact during a crisis:    Suicide Prevention Lifeline: 0-364-668-TALK (8259)    Crisis Text Line Service (available 24 hours a day, 7 days a week): Text [place] to 422344    Call  **CRISIS (246023) from a cell phone to talk to a team of professionals who can help you.  Crisis Services By [facility]: Phone Number:   [place]     [phone]   [place]    [phone]   [place]    [phone]   [place]    [phone]   [place]    [phone]   [place] [phone]   [place]     [phone]       Call 911 or go to my nearest emergency department.     I helped develop this safety plan and agree to use it when needed.  I have been given a copy of this plan.      Client signature _________________________________________________________________  Today s date:   12/17/2019  Adapted from Safety Plan Template 2008 Mitzy Kenny and Dylan Olguin is reprinted with the express permission of the authors.  No portion of the Safety Plan Template may be reproduced without the express, written permission.  You can contact the authors at [email or web address] or [email or web address].

## 2019-12-18 NOTE — TELEPHONE ENCOUNTER
----- Message from Eugene Stevenson sent at 2019  3:42 PM CST -----  [facility] Start  Client will be starting the 55+ program track B-1 on Monday, 19, under Dr. Osborne. Auth required.      Eugene Stevenson on 2019 at 3:44 PM

## 2019-12-23 ENCOUNTER — HOSPITAL ENCOUNTER (OUTPATIENT)
Dept: BEHAVIORAL HEALTH | Facility: CLINIC | Age: 54
End: 2019-12-23
Attending: PSYCHIATRY & NEUROLOGY
Payer: COMMERCIAL

## 2019-12-23 PROBLEM — F33.1 MAJOR DEPRESSIVE DISORDER, RECURRENT EPISODE, MODERATE (H): Status: ACTIVE | Noted: 2019-12-23

## 2019-12-23 PROCEDURE — 90853 GROUP PSYCHOTHERAPY: CPT

## 2019-12-23 PROCEDURE — G0177 OPPS/PHP; TRAIN & EDUC SERV: HCPCS

## 2019-12-23 ASSESSMENT — ANXIETY QUESTIONNAIRES
5. BEING SO RESTLESS THAT IT IS HARD TO SIT STILL: NOT AT ALL
7. FEELING AFRAID AS IF SOMETHING AWFUL MIGHT HAPPEN: NEARLY EVERY DAY
GAD7 TOTAL SCORE: 15
2. NOT BEING ABLE TO STOP OR CONTROL WORRYING: NEARLY EVERY DAY
6. BECOMING EASILY ANNOYED OR IRRITABLE: SEVERAL DAYS
3. WORRYING TOO MUCH ABOUT DIFFERENT THINGS: NEARLY EVERY DAY
1. FEELING NERVOUS, ANXIOUS, OR ON EDGE: NEARLY EVERY DAY
IF YOU CHECKED OFF ANY PROBLEMS ON THIS QUESTIONNAIRE, HOW DIFFICULT HAVE THESE PROBLEMS MADE IT FOR YOU TO DO YOUR WORK, TAKE CARE OF THINGS AT HOME, OR GET ALONG WITH OTHER PEOPLE: EXTREMELY DIFFICULT

## 2019-12-23 ASSESSMENT — PATIENT HEALTH QUESTIONNAIRE - PHQ9
SUM OF ALL RESPONSES TO PHQ QUESTIONS 1-9: 22
5. POOR APPETITE OR OVEREATING: MORE THAN HALF THE DAYS

## 2019-12-23 NOTE — GROUP NOTE
Process Group Note    PATIENT'S NAME: Toi Mg  MRN:   [id number]  :   1965  ACCT. NUMBER: [id number]  DATE OF SERVICE: 19  START TIME:  1:00 PM  END TIME:  1:55 PM  FACILITATOR: Charlotte Awan LMFT  TOPIC:  Process Group    Diagnoses:  296.32 (F33.1) Major Depressive Disorder, Recurrent Episode, Moderate _  300.23 (F40.10) Social Anxiety Disorder  300.01 (F41.0) Panic Disorder      55+ Program  TRACK: B1    NUMBER OF PARTICIPANTS: 6          Data:    Session content: At the start of this group, patients were invited to check in by identifying themselves, describing their current emotional status, and identifying issues to address in this group.   Area(s) of treatment focus addressed in this session included Symptom Management.  Toi processed in group feelings around anxiety and depression and the loss of her ability to work at her job. Identifies she had surgery for her spine a year ago and has not been able to work since. Identifies feelings of worthlessness and feeling scared to leave her house due to falls.     Therapeutic Interventions/Treatment Strategies:  Psychotherapist offered support, feedback and validation. Treatment modalities used include Cognitive Behavioral Therapy.    Assessment:    Patient response:   Patient responded to session by accepting feedback, listening and being attentive    Possible barriers to participation / learning include: and no barriers identified    Health Issues:   None reported       Substance Use Review:   Substance Use: No active concerns identified.    Mental Status/Behavioral Observations  Appearance:   Appropriate   Eye Contact:   Good   Psychomotor Behavior: Normal   Attitude:   Cooperative   Orientation:   All  Speech   Rate / Production: Normal    Volume:  Normal   Mood:    Anxious   Affect:    Appropriate  Worrisome   Thought Content:   Clear  Thought Form:  Coherent  Logical     Insight:    Good     Plan:     Safety Plan: Recommended that  patient call 911 or go to the local ED should there be a change in any of these risk factors.    Patient consented to co-developed safety plan.  Safety and risk management plan was completed.  Patient agreed to use safety plan should any safety concerns arise.  A copy was given to the patient.     Barriers to treatment: None identified    Patient Contracts (see media tab):  None    Substance Use: Not addressed in session     Continue or Discharge: Patient will continue in 55+ Outpatient Program (55+) as planned. Patient is likely to benefit from learning and using skills as they work toward the goals identified in their treatment plan.      CHAVA Tenorio  December 23, 2019

## 2019-12-23 NOTE — GROUP NOTE
RN Group Note    PATIENT'S NAME: Toi Mg  MRN:   [id number]  :   1965  ACCT. NUMBER: [id number]  DATE OF SERVICE: 19  START TIME:  2:00 PM  END TIME:  2:50 PM  FACILITATOR: Karen Lopez LICSW  TOPIC:  RN Group: Mental Health Maintenance  55+ Program  TRACK: B1    NUMBER OF PARTICIPANTS: 6    Summary of Group / Topics Discussed:  Mental Health Maintenance:  Social/Coping Bingo: Patients were educated on the importance of balance in meeting our wellness needs. Topic of social/coping was reviewed and patients participated in playing a verbal response style coping/social BINGO game. In this game, patients were challenged to utilize their understanding of themselves and their coping strategies to respond to the questions on their BINGO cards.    Patient Session Goals / Objectives:  ? Identified the importance of balance in wellness  ? Explained the important aspects of socialization/effective coping strategies  ? Listed ways of improving weak areas in social/coping skills          Patient Participation / Response:  Fully participated with the group by sharing personal reflections / insights and openly received / provided feedback with other participants.    Demonstrated understanding of topics discussed through group discussion and participation    Treatment Plan:  Patient has an initial individualized treatment plan that was created as part of their diagnostic assessment / admission process.  A master individualized treatment plan is in the process of being developed with the patient and multi-disciplinary care team.    ANABELLA Del Castillo

## 2019-12-23 NOTE — GROUP NOTE
EBP Group Note    PATIENT'S NAME: Toi Mg  MRN:   [id number]  :   1965  ACCT. NUMBER: [id number]  DATE OF SERVICE: 19  START TIME:  3:00 PM  END TIME:  3:55 PM  FACILITATOR: Charlotte Awan LMFT  TOPIC:  EBP Group: Relationship Skills  55+ Program  TRACK: B1    NUMBER OF PARTICIPANTS: 6    Summary of Group / Topics Discussed:  Relationship Skills: Basic Communication: Patients were provided with a general overview of interpersonal communication skills and information about how communication skills impact symptoms and functioning. The goal of this topic is to help patients identify communication issues and gain skills to work towards healthier interpersonal communication. Patients reviewed their current awareness of communication issues and how communication skills impact relationships and functioning.      Patient Session Goals / Objectives:    Identified and discussed patients individual challenges with basic communication    Presented and practiced effective communication skills in session    Assisted patients in implementing more effective communication skills in their relationships      Patient Participation / Response:  Fully participated with the group by sharing personal reflections / insights and openly received / provided feedback with other participants.    Demonstrated understanding of relationship skills and communication skills, Verbalized understanding of communication skills, communication challenges, and communication strengths and Practiced skills in session    Treatment Plan:  Patient has an initial individualized treatment plan that was created as part of their diagnostic assessment / admission process.  A master individualized treatment plan is in the process of being developed with the patient and multi-disciplinary care team.    CHAVA Tenorio

## 2019-12-24 ASSESSMENT — ANXIETY QUESTIONNAIRES: GAD7 TOTAL SCORE: 15

## 2019-12-27 ENCOUNTER — HOSPITAL ENCOUNTER (OUTPATIENT)
Dept: BEHAVIORAL HEALTH | Facility: CLINIC | Age: 54
End: 2019-12-27
Attending: PSYCHIATRY & NEUROLOGY
Payer: COMMERCIAL

## 2019-12-27 PROCEDURE — G0177 OPPS/PHP; TRAIN & EDUC SERV: HCPCS

## 2019-12-27 PROCEDURE — 90853 GROUP PSYCHOTHERAPY: CPT

## 2019-12-27 NOTE — GROUP NOTE
EBP Group Note    PATIENT'S NAME: Toi Mg  MRN:   [id number]  :   1965  ACCT. NUMBER: [id number]  DATE OF SERVICE: 19  START TIME:  1:00 PM  END TIME:  1:50 PM  FACILITATOR: Charlotte Awan LMFT  TOPIC:  EBP Group: Enhanced Mindfulness  55+ Program  TRACK: B1    NUMBER OF PARTICIPANTS: 4    Summary of Group / Topics Discussed:  Enhanced Mindfulness: Body and Mind Integration: Patients received an overview and education regarding the importance of including the body in the management of emotional health and self-care and as a direct route to mindfulness practice.  Patients discussed various ways in which the body can serve as an informant to their physical and emotional experiences/need. Patients discussed the body as a direct link to the present moment and to mindfulness practice.  Patients discussed current relationship with body, self-awareness, mindfulness practice and barriers to connection with body.  Patients were guided through breath work and movement to facilitate greater self-awareness, grounding, self-expression, and connection to other.  Patients discussed how the experiential could be applied to better manage mental health and develop guerra connection to self.    Patient Session Goals / Objectives:    Identify how movement awareness could be used for grounding, stress management, self-expression, connection to other and self-regulation    Improved awareness of breath and movement preferences    Identify how movement and the body is used in mindfulness practice    Reflect on use of these practices in everyday life.    Identify barriers to attending to body        Patient Participation / Response:  Moderately participated, sharing some personal reflections / insights and adequately adequately received / provided feedback with other participants.    Practiced skills in session    Treatment Plan:  Patient has an initial individualized treatment plan that was created as part of their  diagnostic assessment / admission process.  A master individualized treatment plan is in the process of being developed with the patient and multi-disciplinary care team.    CHAVA Tenorio

## 2019-12-27 NOTE — GROUP NOTE
RN Group Note    PATIENT'S NAME: Toi Mg  MRN:   [id number]  :   1965  ACCT. NUMBER: [id number]  DATE OF SERVICE: 19  START TIME:  3:00 PM  END TIME:  3:50 PM  FACILITATOR: Ximena Wadsworth RN  TOPIC:  RN Group: Foundation of Health  55+ Program  TRACK: B1    NUMBER OF PARTICIPANTS: 4    Summary of Group / Topics Discussed:  Foundations of Health: Exercise: Why exercise: Patients evaluated and discussed their current exercise behaviors/physical activity routine and identified barriers/obstacles to meeting daily physical activity recommendations. Patients were educated on the four types of exercise: endurance, strength, balance, and flexibility. Patients were educated on the benefits of getting daily physical activity, safety tips for beginning an exercise program, and fitness goal setting strategies.     Patient Session Goals / Objectives:  ? Explained the emotional and physical benefits of exercise  ? Identified how exercise and activity affects bodily function  ? Listed ways to incorporate exercise into daily routine        Patient Participation / Response:  Fully participated with the group by sharing personal reflections / insights and openly received / provided feedback with other participants.    Demonstrated understanding of topics discussed through group discussion and participation, Identified / Expressed personal readiness to practice skills and Verbalized understanding of foundations of health topic    Treatment Plan:  Patient has a current master individualized treatment plan.  See Epic treatment plan for more information.    XIMENA WADSWORTH RN

## 2019-12-27 NOTE — GROUP NOTE
Process Group Note    PATIENT'S NAME: Toi Mg  MRN:   [id number]  :   1965  ACCT. NUMBER: [id number]  DATE OF SERVICE: 19  START TIME:  2:00 PM  END TIME:  2:55 PM  FACILITATOR: Charlotte Awan LMFT  TOPIC:  Process Group    Diagnoses:  296.32 (F33.1) Major Depressive Disorder, Recurrent Episode, Moderate _  300.23 (F40.10) Social Anxiety Disorder  300.01 (F41.0) Panic Disorder      55+ Program  TRACK: B1    NUMBER OF PARTICIPANTS: 4          Data:    Session content: At the start of this group, patients were invited to check in by identifying themselves, describing their current emotional status, and identifying issues to address in this group.   Area(s) of treatment focus addressed in this session included Symptom Management.  Toi processed in group feelings of depression, isolation and low self worth. Identifies she spent time with her family and struggled communicating with her son and felt guilty about his behavior. Reports she attempted to set boundaries with him and felt guilty about that.     Therapeutic Interventions/Treatment Strategies:  Psychotherapist offered support, feedback and validation and reinforced use of skills. Treatment modalities used include Cognitive Behavioral Therapy.    Assessment:    Patient response:   Patient responded to session by accepting feedback, listening and accepting support    Possible barriers to participation / learning include: and no barriers identified    Health Issues:   None reported       Substance Use Review:   Substance Use: No active concerns identified.    Mental Status/Behavioral Observations  Appearance:   Appropriate   Eye Contact:   Fair   Psychomotor Behavior: Restless   Attitude:   Cooperative   Orientation:   All  Speech   Rate / Production: Normal    Volume:  Normal   Mood:    Sad   Affect:    Appropriate  Worrisome   Thought Content:   Clear  Thought Form:  Coherent  Logical     Insight:    Good     Plan:     Safety Plan: No  current safety concerns identified.  Recommended that patient call 911 or go to the local ED should there be a change in any of these risk factors.     Barriers to treatment: None identified    Patient Contracts (see media tab):  None    Substance Use: Not addressed in session     Continue or Discharge: Patient will continue in 55+ Program (55+) as planned. Patient is likely to benefit from learning and using skills as they work toward the goals identified in their treatment plan.      CHAVA Tenorio  December 27, 2019

## 2019-12-30 ENCOUNTER — HOSPITAL ENCOUNTER (OUTPATIENT)
Dept: BEHAVIORAL HEALTH | Facility: CLINIC | Age: 54
End: 2019-12-30
Attending: PSYCHIATRY & NEUROLOGY
Payer: COMMERCIAL

## 2019-12-30 PROCEDURE — 90853 GROUP PSYCHOTHERAPY: CPT

## 2019-12-30 PROCEDURE — G0177 OPPS/PHP; TRAIN & EDUC SERV: HCPCS

## 2019-12-30 NOTE — GROUP NOTE
EBP Group Note    PATIENT'S NAME: Toi Mg  MRN:   [id number]  :   1965  ACCT. NUMBER: [id number]  DATE OF SERVICE: 19  START TIME:  2:00 PM  END TIME:  2:50 PM  FACILITATOR: Charlotte Awan LMFT  TOPIC:  EBP Group: Relationship Skills  55+ Program  TRACK: B1    NUMBER OF PARTICIPANTS: 3    Summary of Group / Topics Discussed:  Relationship Skills: Conflict Resolution: Topic of conflict resolution in interpersonal communication was discussed. Patients received an overview of how communication skills during times of conflict impact symptoms and functioning. Patients discussed their current communication challenges and how this impacts their functioning. Patients also verbalized understanding of skills learned and practiced in session.     Patient Session Goals / Objectives:    Identified and discussed patient individual challenges with communication    Presented and practiced effective communication skills in session    Assisted patients in implementing more effective communication skills in their relationships      Patient Participation / Response:  Fully participated with the group by sharing personal reflections / insights and openly received / provided feedback with other participants.    Demonstrated understanding of topics discussed through group discussion and participation, Identified / Expressed personal readiness to incorporate effective communication skills, Identified plan to address barriers to practicing relationship skills  and Practiced skills in session    Treatment Plan:  Patient has an initial individualized treatment plan that was created as part of their diagnostic assessment / admission process.  A master individualized treatment plan is in the process of being developed with the patient and multi-disciplinary care team.    CHAVA Tenorio

## 2019-12-30 NOTE — GROUP NOTE
RN Group Note    PATIENT'S NAME: Toi Mg  MRN:   [id number]  :   1965  ACCT. NUMBER: [id number]  DATE OF SERVICE: 19  START TIME:  3:00 PM  END TIME:  3:50 PM  FACILITATOR: Heather Bauer RN  TOPIC: VARGAS RN Group: Brain Health  55+ Program  TRACK: b1    NUMBER OF PARTICIPANTS: 3    Summary of Group / Topics Discussed:  Brain Health:  Pathophysiology of stress and anxiety: Patients were educated on the difference between stress, chronic stress, and anxiety. The anatomy and pathophysiology of the body/brain were reviewed to illustrate the immediate effects of stress/anxiety in the body and the long term effects and increased risks for chronic disease that come from unmanaged stress/anxiety. Self-coping strategies to manage symptoms of stress were reviewed and pharmacologic, psychotherapeutic, and complementary treatment options were discussed.    Patient Session Goals / Objectives:  ? Described the differences between stress and anxiety and how the body responds to it  ? Listed the long term effects and increased risks for chronic disease that can arise from unmanaged stress/anxiety  ? Identified and described pharmacologic, psychotherapeutic, and complementary treatment options        Patient Participation / Response:  Fully participated with the group by sharing personal reflections / insights and openly received / provided feedback with other participants.    Demonstrated understanding of topics discussed through group discussion and participation    Treatment Plan:  Patient has a current master individualized treatment plan.  See Epic treatment plan for more information.    Heather Bauer RN

## 2019-12-30 NOTE — GROUP NOTE
Process Group Note    PATIENT'S NAME: Toi Mg  MRN:   [id number]  :   1965  ACCT. NUMBER: [id number]  DATE OF SERVICE: 19  START TIME:  1:00 PM  END TIME:  1:50 PM  FACILITATOR: Charlotte Awan LMFT  TOPIC:  Process Group    Diagnoses:  296.32 (F33.1) Major Depressive Disorder, Recurrent Episode, Moderate _  300.23 (F40.10) Social Anxiety Disorder  300.01 (F41.0) Panic Disorder      55+ Program  TRACK: B1    NUMBER OF PARTICIPANTS: 3          Data:    Session content: At the start of this group, patients were invited to check in by identifying themselves, describing their current emotional status, and identifying issues to address in this group.   Area(s) of treatment focus addressed in this session included Symptom Management.  Toi processed feelings around depression from dealing with pain daily. Reports she worries a lot around the wellbeing of her son and it causes her a great deal of anxiety. Identifies she has been feeling hopeless about many things happening in her life. Identifies she will connect with her brother for support and feels she can reach out to him at any time.     Therapeutic Interventions/Treatment Strategies:  Psychotherapist offered support, feedback and validation and reinforced use of skills. Treatment modalities used include Cognitive Behavioral Therapy.    Assessment:    Patient response:   Patient responded to session by accepting feedback, listening and accepting support    Possible barriers to participation / learning include: tardiness    Health Issues:   Yes: Pain, Associated Psychological Distress       Substance Use Review:   Substance Use: No active concerns identified.    Mental Status/Behavioral Observations  Appearance:   Appropriate   Eye Contact:   Good   Psychomotor Behavior: Normal   Attitude:   Cooperative   Orientation:   All  Speech   Rate / Production: Normal    Volume:  Normal   Mood:    Normal  Affect:    Appropriate   Thought Content:    Clear  Thought Form:  Coherent  Logical     Insight:    Good     Plan:     Safety Plan: No current safety concerns identified.  Recommended that patient call 911 or go to the local ED should there be a change in any of these risk factors.     Barriers to treatment: None identified    Patient Contracts (see media tab):  None    Substance Use: Not addressed in session     Continue or Discharge: Patient will continue in 55+ Program (55+) as planned. Patient is likely to benefit from learning and using skills as they work toward the goals identified in their treatment plan.      CHAVA Tenorio  December 30, 2019

## 2020-01-03 ENCOUNTER — HOSPITAL ENCOUNTER (OUTPATIENT)
Dept: BEHAVIORAL HEALTH | Facility: CLINIC | Age: 55
End: 2020-01-03
Attending: PSYCHIATRY & NEUROLOGY

## 2020-01-03 PROCEDURE — G0177 OPPS/PHP; TRAIN & EDUC SERV: HCPCS

## 2020-01-03 PROCEDURE — 90853 GROUP PSYCHOTHERAPY: CPT

## 2020-01-03 NOTE — GROUP NOTE
Life Skills/OT Group Note    PATIENT'S NAME: Toi Mg  MRN:   [id number]  :   1965  ACCT. NUMBER: [id number]  DATE OF SERVICE: 20  START TIME:  1:00 PM  END TIME:  1:50 PM  FACILITATOR: Christian Waggoner OTR/L  TOPIC:  Life Skills Group: Sensory Approaches in Mental Health  Adult Mental Health Day Treatment  TRACK: B1    NUMBER OF PARTICIPANTS: 4    Summary of Group / Topics Discussed:  Sensory Approaches in Mental Health:  Focused Activity: Patients were provided with verbal and experiential education to identify physical and sensorimotor based activities that can be utilized for stress management, self care, health maintenance, and self regulation.  Patients increased knowledge and awareness of activities that support good self care, build resiliency, and prevent relapse through healthy engagement in mindful focused activities for effective coping with illness and reduction of maladaptive coping skills.     Patient Session Goals / Objectives:    Identified specific physical and sensorimotor based activities for stress management, self care, health maintenance, and self regulation      Improved awareness of activities that are meaningful focused activities that support good self care, build resiliency, and prevent relapse and how this applies to current daily life    Established a plan for practice of these skills in their own environments    Practiced and reflected on how to generalize taught skills to their everyday life      Patient Participation / Response:  Fully participated with the group by sharing personal reflections / insights and openly received / provided feedback with other participants.    Patient presentation: Low mood and energy level. Patient reports she experiences de[ression,anxiety and phsical pain issues related to a back injury at work. She received a score of 11/30 on a mental health recovery scale which palces her in stage 2 /4 recovery.    Treatment Plan:  Patient  has a current master individualized treatment plan.  See Epic treatment plan for more information.    Christian Waggoner, OTR/L

## 2020-01-03 NOTE — GROUP NOTE
Process Group Note    PATIENT'S NAME: Toi Mg  MRN:   [id number]  :   1965  ACCT. NUMBER: [id number]  DATE OF SERVICE: 20  START TIME:  2:00 PM  END TIME:  2:50 PM  FACILITATOR: Charlotte Awan LMFT  TOPIC:  Process Group    Diagnoses:  296.32 (F33.1) Major Depressive Disorder, Recurrent Episode, Moderate _  300.23 (F40.10) Social Anxiety Disorder  300.01 (F41.0) Panic Disorder      55+ Program  TRACK: B1    NUMBER OF PARTICIPANTS: 4          Data:    Session content: At the start of this group, patients were invited to check in by identifying themselves, describing their current emotional status, and identifying issues to address in this group.   Area(s) of treatment focus addressed in this session included Symptom Management.  oTi processed in group feeling depressed so much some days she struggled to get out of bed this week. Identifies she continues to set boundaries with her son and her will not speak with her. Reports she saw him and dropped off gifts this week for him and is sad he has not thanked her. Reports continued struggle with pain and identifies feeling hopeless and exhausted.   Therapeutic Interventions/Treatment Strategies:  Psychotherapist offered support, feedback and validation and reinforced use of skills. Treatment modalities used include Cognitive Behavioral Therapy.    Assessment:    Patient response:   Patient responded to session by accepting feedback, listening, being attentive and accepting support    Possible barriers to participation / learning include: and no barriers identified    Health Issues:   Yes: Pain, Associated Psychological Distress       Substance Use Review:   Substance Use: No active concerns identified.    Mental Status/Behavioral Observations  Appearance:   Appropriate   Eye Contact:   Good   Psychomotor Behavior: Normal   Attitude:   Cooperative   Orientation:   All  Speech   Rate / Production: Normal    Volume:  Normal   Mood:    Anxious   Normal  Affect:    Appropriate   Thought Content:   Clear  Thought Form:  Coherent  Logical     Insight:    Poor     Plan:     Safety Plan: No current safety concerns identified.  Recommended that patient call 911 or go to the local ED should there be a change in any of these risk factors.     Barriers to treatment: None identified    Patient Contracts (see media tab):  None    Substance Use: Not addressed in session     Continue or Discharge: Patient will continue in 55+ Program (55+) as planned. Patient is likely to benefit from learning and using skills as they work toward the goals identified in their treatment plan.      CHAVA Tenorio  January 3, 2020

## 2020-01-03 NOTE — GROUP NOTE
RN Group Note    PATIENT'S NAME: Toi Mg  MRN:   [id number]  :   1965  ACCT. NUMBER: [id number]  DATE OF SERVICE: 20  START TIME:  3:00 PM  END TIME:  3:50 PM  FACILITATOR: Charlotte Awan LMFT  TOPIC:  RN Group: Foundation  Health  55+ Program  TRACK: B1    NUMBER OF PARTICIPANTS: 4    Summary of Group / Topics Discussed:  Foundations of Health: Sleep: Case study/sleep hygiene: Patients explored the connection between sleep and mental illness. Patients learned about how adequate sleep can improve health, productivity, wellness, quality of life, and safety.     Patient Session Goals / Objectives:  ? Demonstrated understanding of sleep hygiene practices and benefits of sleep  ? Identified sleep hygiene strategies to utilize     Described the connection between sleep disturbances and mental illness        Patient Participation / Response:  Moderately participated, sharing some personal reflections / insights and adequately adequately received / provided feedback with other participants.    Verbalized understanding of foundations of health topic    Treatment Plan:  Patient has a current master individualized treatment plan.  See Epic treatment plan for more information.    CHAVA Tenorio

## 2020-01-08 ENCOUNTER — HOSPITAL ENCOUNTER (OUTPATIENT)
Dept: BEHAVIORAL HEALTH | Facility: CLINIC | Age: 55
End: 2020-01-08
Attending: PSYCHIATRY & NEUROLOGY

## 2020-01-08 PROCEDURE — G0177 OPPS/PHP; TRAIN & EDUC SERV: HCPCS | Performed by: OCCUPATIONAL THERAPIST

## 2020-01-08 PROCEDURE — 90853 GROUP PSYCHOTHERAPY: CPT

## 2020-01-08 NOTE — GROUP NOTE
Life Skills/OT Group Note    PATIENT'S NAME: Toi Mg  MRN:   [id number]  :   1965  ACCT. NUMBER: [id number]  DATE OF SERVICE: 20  START TIME:  1:00 PM  END TIME:  1:50 PM  FACILITATOR: Tegan Guevara OTR/L  TOPIC:  Life Skills Group: Lifestyle Balance and Structure  55+ Program  TRACK: B1    NUMBER OF PARTICIPANTS: 4    Summary of Group / Topics Discussed:  Lifestyle Balance and Strucure:  Occupations: Patients were provided with an overview on the importance of daily occupations in support of mental health management.  Patients were assisted to establish, restore, and/or modify performance skills and patterns for improved engagement and promotion of positive mental health through meaningful occupations.  Patients gained awareness of the connection between who they are (self identity) with what they do.    Patient Session Goals / Objectives:    Increased awareness of how patient s functioning in identified meaningful occupations are impacted by their mental health status     Developed performance skills and performance patterns to enhance occupational engagement    Explored ways to generalize new awareness and skills to their everyday life        Patient Participation / Response:  Moderately participated, sharing some personal reflections / insights and adequately adequately received / provided feedback with other participants.    Patient presentation: constricted affect; observed to grimace in pain when moving; depressed mood; poor concentration; negative self talk observed and Patient would benefit from additional opportunities to practice the content to be able to generalize it to their everyday life with increased intentionality, consistency, and efficacy in support of their psychiatric recovery    Treatment Plan:  Patient has a current master individualized treatment plan.  See Epic treatment plan for more information.    LEVI Lainez

## 2020-01-08 NOTE — GROUP NOTE
Process Group Note    PATIENT'S NAME: Toi Mg  MRN:   [id number]  :   1965  ACCT. NUMBER: [id number]  DATE OF SERVICE: 20  START TIME:  2:00 PM  END TIME:  2:55 PM  FACILITATOR: Charlotte Awan LMFT  TOPIC:  Process Group    Diagnoses:  296.32 (F33.1) Major Depressive Disorder, Recurrent Episode, Moderate _  300.23 (F40.10) Social Anxiety Disorder  300.01 (F41.0) Panic Disorder      55+ Program  TRACK: B1    NUMBER OF PARTICIPANTS: 4          Data:    Session content: At the start of this group, patients were invited to check in by identifying themselves, describing their current emotional status, and identifying issues to address in this group.   Area(s) of treatment focus addressed in this session included Symptom Management.  Toi processed in group continuing to be overwhelmed by anxiety and depression. Reports she had not left her house or attended to her adl's since last Saturday. Identifies ruminating on thoughts from her past surgery and being overwhelmed with physical and emotional pain. Identifies she is able to set one small goal to shower and brush her teeth for the next two days until next group.      Therapeutic Interventions/Treatment Strategies:  Psychotherapist offered support, feedback and validation and reinforced use of skills. Treatment modalities used include Cognitive Behavioral Therapy.    Assessment:    Patient response:   Patient responded to session by accepting feedback, listening, being attentive and accepting support    Possible barriers to participation / learning include: and no barriers identified    Health Issues:   Yes: Pain, Associated Psychological Distress       Substance Use Review:   Substance Use: No active concerns identified.    Mental Status/Behavioral Observations  Appearance:   Appropriate   Eye Contact:   Good   Psychomotor Behavior: Normal   Attitude:   Cooperative   Orientation:   All  Speech   Rate / Production: Normal    Volume:  Normal    Mood:    Normal  Affect:    Appropriate   Thought Content:   Clear  Thought Form:  Coherent  Logical     Insight:    Good     Plan:     Safety Plan: No current safety concerns identified.  Recommended that patient call 911 or go to the local ED should there be a change in any of these risk factors.     Barriers to treatment: None identified    Patient Contracts (see media tab):  None    Substance Use: Not addressed in session     Continue or Discharge: Patient will continue in 55+ Program (55+) as planned. Patient is likely to benefit from learning and using skills as they work toward the goals identified in their treatment plan.      CHAVA Tenorio  January 8, 2020

## 2020-01-08 NOTE — GROUP NOTE
EBP Group Note    PATIENT'S NAME: Toi Mg  MRN:   [id number]  :   1965  ACCT. NUMBER: [id number]  DATE OF SERVICE: 20  START TIME:  3:00 PM  END TIME:  3:55 PM  FACILITATOR: Charlotte Awan LMFT  TOPIC:  EBP Group: Specialty Awareness  55+ Program  TRACK: B1    NUMBER OF PARTICIPANTS: 4    Summary of Group / Topics Discussed:  Specialty Topics: Life Transitions: The topic of life transitions was presented in order to help patients to better understand the challenges presented by life transitions, and how to best navigate them. Exploring the phases of transition and how one works through them was discussed. Patients were provided with information regarding community resources.     Patient Session Goals / Objectives:    Discussed the timing and nature of major life transitions    Explored how life transitions may impact mental health and functioning    Discussed coping strategies to manage symptoms and help with transitioning    Discussed and planned a successful transition        Patient Participation / Response:  Fully participated with the group by sharing personal reflections / insights and openly received / provided feedback with other participants.    Demonstrated understanding of topics discussed through group discussion and participation, Verbalized understanding of ways to proactively manage illness and Practiced skills in session    Treatment Plan:  Patient has a current master individualized treatment plan.  See Epic treatment plan for more information.    CHAVA Tenorio

## 2020-01-10 ENCOUNTER — HOSPITAL ENCOUNTER (OUTPATIENT)
Dept: BEHAVIORAL HEALTH | Facility: CLINIC | Age: 55
End: 2020-01-10
Attending: PSYCHIATRY & NEUROLOGY

## 2020-01-10 PROCEDURE — 90853 GROUP PSYCHOTHERAPY: CPT

## 2020-01-10 PROCEDURE — G0177 OPPS/PHP; TRAIN & EDUC SERV: HCPCS | Performed by: OCCUPATIONAL THERAPIST

## 2020-01-10 PROCEDURE — G0177 OPPS/PHP; TRAIN & EDUC SERV: HCPCS

## 2020-01-10 NOTE — GROUP NOTE
Process Group Note    PATIENT'S NAME: Toi Mg  MRN:   [id number]  :   1965  ACCT. NUMBER: [id number]  DATE OF SERVICE: 1/10/20  START TIME:  2:00 PM  END TIME:  2:50 PM  FACILITATOR: Charlotte Awan LMFT  TOPIC:  Process Group    Diagnoses:  296.32 (F33.1) Major Depressive Disorder, Recurrent Episode, Moderate _  300.23 (F40.10) Social Anxiety Disorder  300.01 (F41.0) Panic Disorder      55+ Program  TRACK: B1    NUMBER OF PARTICIPANTS: 5          Data:    Session content: At the start of this group, patients were invited to check in by identifying themselves, describing their current emotional status, and identifying issues to address in this group.   Area(s) of treatment focus addressed in this session included Symptom Management.  Toi processed in group continued anxiety around dealing with chronic pain. Identifies her pain prevents her from leaving her apartment most of the time. Toi identifies she built up her courage to go to a craft store with her dog. Reports she felt overwhelmed by being in the store and feeling overwhelmed when people spoke to her about her dog. Identifies using deep breathing to help.     Therapeutic Interventions/Treatment Strategies:  Psychotherapist offered support, feedback and validation and reinforced use of skills. Treatment modalities used include Cognitive Behavioral Therapy.    Assessment:    Patient response:   Patient responded to session by accepting feedback, listening, focusing on goals, accepting support and verbalizing understanding    Possible barriers to participation / learning include: and no barriers identified    Health Issues:   Yes: Pain, Associated Psychological Distress       Substance Use Review:   Substance Use: No active concerns identified.    Mental Status/Behavioral Observations  Appearance:   Appropriate   Eye Contact:   Fair   Psychomotor Behavior: Restless   Attitude:   Cooperative   Orientation:   All  Speech   Rate /  Production: Slow    Volume:  Soft   Mood:    Anxious  Sad   Affect:    Worrisome   Thought Content:   Clear  Thought Form:  Coherent  Logical     Insight:    Fair     Plan:     Safety Plan: No current safety concerns identified.  Recommended that patient call 911 or go to the local ED should there be a change in any of these risk factors.     Barriers to treatment: None identified    Patient Contracts (see media tab):  None    Substance Use: Not addressed in session     Continue or Discharge: Patient will continue in 55+ Program (55+) as planned. Patient is likely to benefit from learning and using skills as they work toward the goals identified in their treatment plan.      CHAVA Tenorio  January 10, 2020

## 2020-01-10 NOTE — GROUP NOTE
RN Group Note    PATIENT'S NAME: Toi Mg  MRN:   [id number]  :   1965  ACCT. NUMBER: [id number]  DATE OF SERVICE: 1/10/20  START TIME:  3:00 PM  END TIME:  3:50 PM  FACILITATOR: Ximena Wadsworth RN  TOPIC:  RN Group: Mind/Body Practice & Complementary  55+ Program  TRACK: B1    NUMBER OF PARTICIPANTS: 5    Summary of Group / Topics Discussed:  Mind/Body Practice & Complementary Therapies:  Progressive Muscle Relaxation: In addition to affecting our mood and behavior, psychological stress can cause a myriad of physical symptoms in our body. Patients were educated on these effects and guided to increased self-awareness of how stress affects their body. The purpose, benefits, history, and techniques of progressive muscle relaxation were discussed. In an instructor guided experiential, patients were guided to practice PMR to help reduce physical symptoms of psychological stress and achieve a more balanced feeling of well-being.    Patient Session Goals / Objectives:  ? Identified physiological symptoms of stress on the body  ? Listed & Explained the purpose and benefits to practicing PMR   ? Practiced progressive muscle relaxation experiential        Patient Participation / Response:  Fully participated with the group by sharing personal reflections / insights and openly received / provided feedback with other participants.    Demonstrated understanding of topics discussed through group discussion and participation, Identified / Expressed personal readiness to practice skills and Verbalized understanding of Mind/Body Practice & Complementary Therapies topic    Treatment Plan:  Patient has a current master individualized treatment plan.  See Epic treatment plan for more information.    XIMENA WADSWORTH RN

## 2020-01-10 NOTE — GROUP NOTE
Life Skills/OT Group Note    PATIENT'S NAME: Toi Mg  MRN:   [id number]  :   1965  ACCT. NUMBER: [id number]  DATE OF SERVICE: 1/10/20  START TIME:  1:00 PM  END TIME:  1:50 PM  FACILITATOR: Tegan Guevara OTR/L  TOPIC:  Life Skills Group: Communication and Social Skills Development  55+ Program  TRACK: B1    NUMBER OF PARTICIPANTS: 5    Summary of Group / Topics Discussed:  Communication and Social Skills Development: Social Supports: Patients were taught and gained awareness of the importance of asking for help from other people as a way to expand their support network.  Patients identified both personal strengths and barriers in asking for help.  Patients were provided with skills and opportunity to practice asking for help to improve overall communication and connection with other people.  Discussed and provided patients with a written list of available community resources to consider adding to expand their support network.     Patient Session Goals / Objectives:    Identified strengths and opportunities for growth in asking for help and how this impacts their ability to clearly communicate needs to other people       Improved awareness of important aspects of asking for help and support and how this relates to mental health recovery        Established a plan for practice of these skills in their own environments    Practiced and reflected on how to generalize taught skills to their everyday life        Patient Participation / Response:  Fully participated with the group by sharing personal reflections / insights and openly received / provided feedback with other participants.    Patient presentation: observed to grimace in pain at times; rocking in chair throughout the session; depressed and anxious mood; reported PTSD symptoms as well. , Demonstrated understanding of content through identifying personal barriers to asking and accepting help including difficulty being vulnerable and trusting  others   and Patient would benefit from additional opportunities to practice the content to be able to generalize it to their everyday life with increased intentionality, consistency, and efficacy in support of their psychiatric recovery    Treatment Plan:  Patient has a current master individualized treatment plan.  See Epic treatment plan for more information.    Tegan Guevara, OTR/L

## 2020-01-14 NOTE — TELEPHONE ENCOUNTER
----- Message from Esteban Lui sent at 1/14/2020 12:44 PM CST -----    ----- Message -----  From: Idalia Levine  Sent: 1/14/2020  12:38 PM CST  To: [facility]    1/14/2020 per [place] its online no record of any ma for jan 2020    Per [facility] online the [facility] ma policy terminated 12/31/2019     In basket message sent to op intake [facility]

## 2020-01-14 NOTE — TELEPHONE ENCOUNTER
----- Message from CHAVA Tenorio sent at 1/14/2020 11:26 AM CST -----  Regarding: MA benefits  Hello, I received call from patient she has submitted her medical assistance renewal for the year and was notified her MA was not active. Pt has concerns regarding coming to program due to being billed for services. Could you please look into if her MA case has been re-opened and she can return to 55+ IOP?    Thank you for your time,  Charlotte LARSEN

## 2020-01-17 ENCOUNTER — TELEPHONE (OUTPATIENT)
Dept: BEHAVIORAL HEALTH | Facility: CLINIC | Age: 55
End: 2020-01-17

## 2020-02-05 ENCOUNTER — TELEPHONE (OUTPATIENT)
Dept: BEHAVIORAL HEALTH | Facility: CLINIC | Age: 55
End: 2020-02-05

## 2020-02-05 NOTE — TELEPHONE ENCOUNTER
----- Message from CHAVA Tenorio sent at 2/5/2020 12:16 PM CST -----  Regarding: RE: Verify benefits for start B1 55+ OP  Thank you for that information. Start date would be 02/07/2020  ----- Message -----  From: Hailey Milian LICSW  Sent: 2/5/2020  11:14 AM CST  To: CHAVA Tenorio, ANABELLA Tomas, #  Subject: RE: Verify benefits for start B1 55+ OP          Hello,    I requested benefits but know that per 2020 [employer] Programs Authorization and Notification Requirements, Adult Day Treatment requires authorization beyond 12 week per calendar year threshold.     Please let us know when you want patient to start.    Thank you,  Nivia   ----- Message -----  From: Charlotte Awan LMFT  Sent: 2/5/2020  10:53 AM CST  To: ANABELLA Tomas, [facility]  Subject: Verify benefits for start B1 55+ OP              Hello, spoke with patient this morning regarding MA status open. Patient reports MA is active as of 02/01/2020 and would like to restart program. Can you please verify benefits and add to [facility] for 55+ OP B1 track?  Thank you

## 2020-02-05 NOTE — TELEPHONE ENCOUNTER
----- Message from ANABELLA Vega sent at 2/5/2020 11:14 AM CST -----  Regarding: RE: Verify benefits for start B1 55+ OP  Hello,    I requested benefits but know that per 2020 [facility] Medicaid Programs Authorization and Notification Requirements, Adult Day Treatment requires authorization beyond 12 week per calendar year threshold.     Please let us know when you want patient to start.    Thank you,  Nivia   ----- Message -----  From: Charlotte Awan LMFT  Sent: 2/5/2020  10:53 AM CST  To: ANABELLA Tomas, [facility]  Subject: Verify benefits for start B1 55+ OP              Hello, spoke with patient this morning regarding MA status open. Patient reports MA is active as of 02/01/2020 and would like to restart program. Can you please verify benefits and add to [facility] for 55+ OP B1 track?  Thank you

## 2020-02-10 ENCOUNTER — HOSPITAL ENCOUNTER (OUTPATIENT)
Dept: BEHAVIORAL HEALTH | Facility: CLINIC | Age: 55
End: 2020-02-10
Attending: PSYCHIATRY & NEUROLOGY
Payer: COMMERCIAL

## 2020-02-10 PROCEDURE — G0177 OPPS/PHP; TRAIN & EDUC SERV: HCPCS

## 2020-02-10 PROCEDURE — 90853 GROUP PSYCHOTHERAPY: CPT

## 2020-02-10 NOTE — GROUP NOTE
Process Group Note    PATIENT'S NAME: Toi Mg  MRN:   [id number]  :   1965  ACCT. NUMBER: [id number]  DATE OF SERVICE: 2/10/20  START TIME:  1:00 PM  END TIME:  1:55 PM  FACILITATOR: Charlotte Awan LMFT  TOPIC:  Process Group    Diagnoses:  296.32 (F33.1) Major Depressive Disorder, Recurrent Episode, Moderate _  300.23 (F40.10) Social Anxiety Disorder  300.01 (F41.0) Panic Disorder      55+ Program  TRACK: B1    NUMBER OF PARTICIPANTS: 5          Data:    Session content: At the start of this group, patients were invited to check in by identifying themselves, describing their current emotional status, and identifying issues to address in this group.   Area(s) of treatment focus addressed in this session included Symptom Management  Toi processed in group feeling isolated and alone since having to stop attendance due to insurance. Identifies her depression symptoms have increased and she rarely attends to her ADLs. Identified she no longer has her support dog and that has also prevented her from doing activities.  Reports continued struggle with pain and feeling hopeless it will be alieved.    Therapeutic Interventions/Treatment Strategies:  Psychotherapist offered support, feedback and validation and reinforced use of skills. Treatment modalities used include Cognitive Behavioral Therapy.    Assessment:    Patient response:   Patient responded to session by listening, accepting support and verbalizing understanding    Possible barriers to participation / learning include: and no barriers identified    Health Issues:   None reported       Substance Use Review:   Substance Use: No active concerns identified.    Mental Status/Behavioral Observations  Appearance:   Appropriate   Eye Contact:   Good   Psychomotor Behavior: Restless   Attitude:   Cooperative   Orientation:   All  Speech   Rate / Production: Normal    Volume:  Normal   Mood:    Anxious  Depressed   Affect:    Worrisome   Thought Content:    Clear  Thought Form:  Coherent  Logical     Insight:    Poor     Plan:     Safety Plan: No current safety concerns identified.  Recommended that patient call 911 or go to the local ED should there be a change in any of these risk factors.     Barriers to treatment: None identified    Patient Contracts (see media tab):  None    Substance Use: Not addressed in session     Continue or Discharge: Patient will continue in 55+ Program (55+) as planned. Patient is likely to benefit from learning and using skills as they work toward the goals identified in their treatment plan.      CHAVA Tenorio  February 11, 2020

## 2020-02-10 NOTE — GROUP NOTE
RN Group Note    PATIENT'S NAME: Toi Mg  MRN:   [id number]  :   1965  ACCT. NUMBER: [id number]  DATE OF SERVICE: 2/10/20  START TIME:  3:00 PM  END TIME:  3:50 PM  FACILITATOR: Heather Bauer RN  TOPIC: MH RN Group: Health Maintenance  55+ Program  TRACK: b1    NUMBER OF PARTICIPANTS: 5    Summary of Group / Topics Discussed:  Health Maintenance: Eight Dimensions of Wellness: The concept of holistic health through the model of eight dimensions was introduced. Group members participated in identifying behaviors and activities in each of the dimensions of wellness.  The importance of each dimension was reinforced and the concept of balance in life as it relates to wellness was explored.      Patient Session Goals / Objectives:    Verbalized understanding of balance in wellness and how it relates to their life    Identified and explained the eight dimensions of wellness    Categorized activities and wellness needs into corresponding dimensions appropriately during exercise          Patient Participation / Response:  Fully participated with the group by sharing personal reflections / insights and openly received / provided feedback with other participants.    Demonstrated understanding of topics discussed through group discussion and participation    Treatment Plan:  Patient has a current master individualized treatment plan.  See Epic treatment plan for more information.    Heather Bauer RN

## 2020-02-10 NOTE — GROUP NOTE
EBP Group Note    PATIENT'S NAME: Toi Mg  MRN:   [id number]  :   1965  ACCT. NUMBER: [id number]  DATE OF SERVICE: 2/10/20  START TIME:  2:00 PM  END TIME:  2:55 PM  FACILITATOR: Charlotte Awan LMFT  TOPIC:  EBP Group: Symptom Awareness  55+ Program  TRACK: B1    NUMBER OF PARTICIPANTS: 5    Summary of Group / Topics Discussed:  Symptom Awareness: Mood Disorders: Patients received a general overview of mood disorders including depressive disorders, anxiety disorders, and bipolar disorders and how it relates to their current symptoms. The purpose is to promote understanding of their diagnoses and how it impacts their functioning. Patients reviewed their current awareness of symptoms and diagnoses. Patients received information regarding diagnoses, etiology, cultural, and environmental factors as well as impact on functioning.     Patient Session Goals / Objectives:    Discussed patient individual symptoms and experiences    Reviewed diagnostic criteria and etiology of diagnoses         Patient Participation / Response:  Fully participated with the group by sharing personal reflections / insights and openly received / provided feedback with other participants.    Demonstrated understanding of topics discussed through group discussion and participation, Identified plan to address barriers to increase awareness and knowledge about diagnoses and symptoms  and Practiced skills in session    Treatment Plan:  Patient has a current master individualized treatment plan.  See Epic treatment plan for more information.    CHAVA Tenorio

## 2020-02-12 ENCOUNTER — HOSPITAL ENCOUNTER (OUTPATIENT)
Dept: BEHAVIORAL HEALTH | Facility: CLINIC | Age: 55
End: 2020-02-12
Attending: PSYCHIATRY & NEUROLOGY
Payer: COMMERCIAL

## 2020-02-12 ENCOUNTER — TELEPHONE (OUTPATIENT)
Dept: FAMILY MEDICINE | Facility: CLINIC | Age: 55
End: 2020-02-12

## 2020-02-12 PROCEDURE — G0177 OPPS/PHP; TRAIN & EDUC SERV: HCPCS | Performed by: OCCUPATIONAL THERAPIST

## 2020-02-12 PROCEDURE — 90853 GROUP PSYCHOTHERAPY: CPT

## 2020-02-12 NOTE — TELEPHONE ENCOUNTER
Panel Management Review      Patient has the following on her problem list:     Hypertension   Last three blood pressure readings:  BP Readings from Last 3 Encounters:   02/13/19 130/90   02/04/19 (!) 160/98   01/07/19 144/88     Blood pressure: FAILED    HTN Guidelines:  Less than 140/90      Composite cancer screening  Chart review shows that this patient is due/due soon for the following Colonoscopy  Summary:    Patient is due/failing the following:   Physical,BP CHECK and COLONOSCOPY    Action needed:   None, patient see [facility] clinic for care.    Type of outreach:    none    Questions for provider review:    None                                                                                                                                    An Lucina, ELAINA       Chart routed to none .

## 2020-02-12 NOTE — GROUP NOTE
EBP Group Note    PATIENT'S NAME: Toi Mg  MRN:   [id number]  :   1965  [id number]  DATE OF SERVICE: 20  START TIME:  3:00 PM  END TIME:  3:50 PM  FACILITATOR: Charlotte Awan LMFT  TOPIC:  EBP Group: Coping Skills  55+ Program  TRACK: B1    NUMBER OF PARTICIPANTS: 3    Summary of Group / Topics Discussed:  Coping Skills: Relapse Planning: Patients discussed and increased understanding of how anticipating and planning for possible increased symptoms is a proactive way to reduce the likelihood of relapse.  Patients shared individualized factors that may lead to increased symptoms and decompensation in functioning.  Each patient developed a relapse prevention plan designed to help them recognize when they may have increasing symptoms requiring them to take action and notify their key supports and care team.      Patient Session Goals / Objectives:    Identify and understand what factors may contribute to symptom relapse.    Identify actions that may be taken to manage increased symptoms/stressors.    Create an individualized written relapse plan    Problem solve barriers to plan creation and implementation    Share relapse plan with key support people        Patient Participation / Response:  Fully participated with the group by sharing personal reflections / insights and openly received / provided feedback with other participants.    Demonstrated understanding of topics discussed through group discussion and participation, Identified barriers to applying coping skills and Identified / Expressed personal readiness to practice new coping skills    Treatment Plan:  Patient has a current master individualized treatment plan.  See Epic treatment plan for more information.    CHAVA Tenorio

## 2020-02-12 NOTE — GROUP NOTE
Process Group Note    PATIENT'S NAME: Toi Mg  MRN:   [id number]  :   1965  ACCT. NUMBER: [id number]  DATE OF SERVICE: 20  START TIME:  2:00 PM  END TIME:  2:55 PM  FACILITATOR: Charlotte Awan LMFT  TOPIC:  Process Group    Diagnoses  296.32 (F33.1) Major Depressive Disorder, Recurrent Episode, Moderate _  300.23 (F40.10) Social Anxiety Disorder  300.01 (F41.0) Panic Disorder      55+ Program  TRACK: B1    NUMBER OF PARTICIPANTS: 3          Data:    Session content: At the start of this group, patients were invited to check in by identifying themselves, describing their current emotional status, and identifying issues to address in this group.   Area(s) of treatment focus addressed in this session included Symptom Management.  Toi processed in group feeling less anxious then previous day. Identifies she has a hard time pulling herself out of her negative cycle of thinking. Identifies she has awareness and then gets stuck in self blame. Encouraged to practice self compassion and challenge cognitive distortions. Reports she is going to an occupational doctor for evaluation around working ability and is hopeful she will find out she can return to work.     Therapeutic Interventions/Treatment Strategies:  Psychotherapist offered support, feedback and validation and reinforced use of skills. Treatment modalities used include Cognitive Behavioral Therapy.    Assessment:    Patient response:   Patient responded to session by accepting feedback, focusing on goals and accepting support    Possible barriers to participation / learning include: and no barriers identified    Health Issues:   Yes: Pain, Associated Psychological Distress       Substance Use Review:   Substance Use: No active concerns identified.    Mental Status/Behavioral Observations  Appearance:   Appropriate   Eye Contact:   Good   Psychomotor Behavior: Restless   Attitude:   Cooperative   Orientation:   All  Speech   Rate /  Production: Normal    Volume:  Normal   Mood:    Anxious   Affect:    Worrisome   Thought Content:   Clear  Thought Form:  Coherent  Logical     Insight:    Fair     Plan:     Safety Plan: No current safety concerns identified.  Recommended that patient call 911 or go to the local ED should there be a change in any of these risk factors.     Barriers to treatment: None identified    Patient Contracts (see media tab):  None    Substance Use: Not addressed in session     Continue or Discharge: Patient will continue in 55+ Program (55+) as planned. Patient is likely to benefit from learning and using skills as they work toward the goals identified in their treatment plan.      CHAVA Tenorio  February 12, 2020

## 2020-02-13 NOTE — GROUP NOTE
Life Skills/OT Group Note    PATIENT'S NAME: Toi Mg  MRN:   [id number]  :   1965  ACCT. NUMBER: [id number]  DATE OF SERVICE: 20  START TIME:  1:00 PM  END TIME:  1:50 PM  FACILITATOR: Tegan Guevara OTR/L  TOPIC:  Life Skills Group: Sensory Approaches in Mental Health  55+ Program  TRACK: B1    NUMBER OF PARTICIPANTS: 3    Summary of Group / Topics Discussed:  Sensory Approaches in Mental Health:  Focused Activity: Patients were provided with verbal and experiential education to identify physical and sensorimotor based activities that can be utilized for stress management, self care, health maintenance, and self regulation.  Patients increased knowledge and awareness of activities that support good self care, build resiliency, and prevent relapse through healthy engagement in mindful focused activities for effective coping with illness and reduction of maladaptive coping skills.     Patient Session Goals / Objectives:    Identified specific physical and sensorimotor based activities for stress management, self care, health maintenance, and self regulation      Improved awareness of activities that are meaningful focused activities that support good self care, build resiliency, and prevent relapse and how this applies to current daily life    Established a plan for practice of these skills in their own environments    Practiced and reflected on how to generalize taught skills to their everyday life      Patient Participation / Response:  Fully participated with the group by sharing personal reflections / insights and openly received / provided feedback with other participants.    Patient presentation: constricted affect; depressed mood, low energy, appeared in physical pain when moving (i.e. frequent grimace, slowness observed); active participant in discussions, Demonstrated understanding of content through identifying different types of focused activity she enjoys and does not enjoy;  discussed barriers to engagement in focused activity  and Patient would benefit from additional opportunities to practice the content to be able to generalize it to their everyday life with increased intentionality, consistency, and efficacy in support of their psychiatric recovery    Treatment Plan:  Patient has a current master individualized treatment plan.  See Epic treatment plan for more information.    Tegan Guevara, OTR/L

## 2020-02-14 ENCOUNTER — HOSPITAL ENCOUNTER (OUTPATIENT)
Dept: BEHAVIORAL HEALTH | Facility: CLINIC | Age: 55
End: 2020-02-14
Attending: PSYCHIATRY & NEUROLOGY
Payer: COMMERCIAL

## 2020-02-14 PROCEDURE — G0177 OPPS/PHP; TRAIN & EDUC SERV: HCPCS | Performed by: OCCUPATIONAL THERAPIST

## 2020-02-14 PROCEDURE — G0177 OPPS/PHP; TRAIN & EDUC SERV: HCPCS

## 2020-02-14 PROCEDURE — 90853 GROUP PSYCHOTHERAPY: CPT

## 2020-02-14 NOTE — GROUP NOTE
Process Group Note    PATIENT'S NAME: Toi Mg  MRN:   [id number]  :   1965  ACCT. NUMBER: [id number]  DATE OF SERVICE: 20  START TIME:  2:00 PM  END TIME:  2:55 PM  FACILITATOR: Charlotte Awan LMFT  TOPIC:  Process Group    Diagnoses:  296.32 (F33.1) Major Depressive Disorder, Recurrent Episode, Moderate _  300.23 (F40.10) Social Anxiety Disorder  300.01 (F41.0) Panic Disorder      55+ Program  TRACK: B1    NUMBER OF PARTICIPANTS: 5          Data:    Session content: At the start of this group, patients were invited to check in by identifying themselves, describing their current emotional status, and identifying issues to address in this group.   Area(s) of treatment focus addressed in this session included Symptom Management and Community Resources/Discharge Planning.  Toi processed in group feeling hopeful about her health since seeing the Occupational Therapist the other day. Reports she found out her spine has healed to the point she is able to go back to work part time if she would like. Identifies she has been hopeful to return to work and gives her more incentive to improve her skills to manage her anxiety. Identifies this weekend is the anniversary of her parents death and brings up memories of the good times she had with her mother. Reports continued grief from loss. Encouraged to connect with family and have celebration of her life to focus on the positive memories with family.     Therapeutic Interventions/Treatment Strategies:  Psychotherapist offered support, feedback and validation and reinforced use of skills. Treatment modalities used include Cognitive Behavioral Therapy.    Assessment:    Patient response:   Patient responded to session by accepting feedback, focusing on goals, being attentive, accepting support and verbalizing understanding    Possible barriers to participation / learning include: and no barriers identified    Health Issues:   None reported       Substance  Use Review:   Substance Use: No active concerns identified.    Mental Status/Behavioral Observations  Appearance:   Appropriate   Eye Contact:   Good   Psychomotor Behavior: Normal   Attitude:   Cooperative   Orientation:   All  Speech   Rate / Production: Normal    Volume:  Normal   Mood:    Anxious   Affect:    Worrisome   Thought Content:   Clear  Thought Form:  Coherent  Logical     Insight:    Fair     Plan:     Safety Plan: No current safety concerns identified.  Recommended that patient call 911 or go to the local ED should there be a change in any of these risk factors.     Barriers to treatment: None identified    Patient Contracts (see media tab):  None    Substance Use: Not addressed in session     Continue or Discharge: Patient will continue in 55+ Program (55+) as planned. Patient is likely to benefit from learning and using skills as they work toward the goals identified in their treatment plan.      CHAVA Tenorio  February 14, 2020

## 2020-02-14 NOTE — GROUP NOTE
Life Skills/OT Group Note    PATIENT'S NAME: Toi Mg  MRN:   [id number]  :   1965  ACCT. NUMBER: [id number]  DATE OF SERVICE: 20  START TIME:  1:00 PM  END TIME:  1:50 PM  FACILITATOR: Tegan Guevara OTR/L  TOPIC:  Life Skills Group: Resiliency Development  55+ Program  TRACK: B1    NUMBER OF PARTICIPANTS: 5    Summary of Group / Topics Discussed:  Resiliency Development:  Patients actively participated in a psychoeducational discussion and written activity focused on neuroplasticity, negativity bias in our brain, and the need for increased awareness of positive events, experiences, and thinking as a way to challenge symptoms and stressors and build healthy coping skills.  Discussion also focused on the impact traumatic experiences and events impact the brain and shape our experiences.  Patients were given the opportunity to reflect and identify skills to increase awareness of positive aspects of their life.  Patients were taught about the importance of self kindness on mental health wellbeing.  Patients were also given the opportunity to improve self efficacy, self sufficiency, quality of life and a sense of mastery and competency by identifying positive aspects of their life and to instill hope.      Patient Session Goals / Objectives:    Identified personal strengths and qualities about themselves as a way to provide hope and build self-compassion as a way to effectively manage both mental health and substance abuse/abuse symptoms     Increased awareness of how our brains work and the need to recognize/build positive experiences, thoughts and emotions as part of mental health recovery    Improved awareness of positive qualities and how this contribute to the process of recovery and mental health wellbeing and resiliency      Established a plan for practice of these skills in their own environments    Practiced and reflected on how to generalize taught skills to their everyday  life        Patient Participation / Response:  Moderately participated, sharing some personal reflections / insights and adequately adequately received / provided feedback with other participants.    Patient presentation: constricted affect; aware of her difficulty recognizing positive thinking; mood seemed low, Verbalized understanding of content and Patient would benefit from additional opportunities to practice the content to be able to generalize it to their everyday life with increased intentionality, consistency, and efficacy in support of their psychiatric recovery    Treatment Plan:  Patient has a current master individualized treatment plan.  See Epic treatment plan for more information.    Tegan Guevara, OTR/L

## 2020-02-14 NOTE — GROUP NOTE
RN Group Note    PATIENT'S NAME: Toi gM  MRN:   [id number]  :   1965  ACCT. NUMBER: [id number]  DATE OF SERVICE: 20  START TIME:  3:00 PM  END TIME:  3:50 PM  FACILITATOR: Ximena Wadsworth RN  TOPIC: MH RN Group: [facility]  55+ Program  TRACK: B1    NUMBER OF PARTICIPANTS: 5    Summary of Group / Topics Discussed:  Foundations [facility] Health: Sleep: Case study/sleep hygiene: Patients explored the connection between sleep and mental illness. Patients learned about how adequate sleep can improve health, productivity, wellness, quality of life, and safety.     Patient Session Goals / Objectives:  ? Demonstrated understanding of sleep hygiene practices and benefits of sleep  ? Identified sleep hygiene strategies to utilize     Described the connection between sleep disturbances and mental illness        Patient Participation / Response:  Fully participated with the group by sharing personal reflections / insights and openly received / provided feedback with other participants.    Demonstrated understanding of topics discussed through group discussion and participation and Identified / Expressed personal readiness to practice skills    Treatment Plan:  Patient has a current master individualized treatment plan.  See Epic treatment plan for more information.    XIMENA WADSWORTH, RN

## 2020-02-17 ENCOUNTER — HOSPITAL ENCOUNTER (OUTPATIENT)
Dept: BEHAVIORAL HEALTH | Facility: CLINIC | Age: 55
End: 2020-02-17
Attending: PSYCHIATRY & NEUROLOGY
Payer: COMMERCIAL

## 2020-02-17 PROCEDURE — 90853 GROUP PSYCHOTHERAPY: CPT

## 2020-02-17 PROCEDURE — G0177 OPPS/PHP; TRAIN & EDUC SERV: HCPCS

## 2020-02-17 NOTE — GROUP NOTE
RN Group Note    PATIENT'S NAME: Toi Mg  MRN:   [id number]  :   1965  [id number]  DATE OF SERVICE: 20  START TIME:  3:00 PM  END TIME:  3:50 PM  FACILITATOR: eHather Bauer RN  TOPIC: VARGAS RN Group: Brain Health  55+ Program  TRACK: b1    NUMBER OF PARTICIPANTS: 4    Summary of Group / Topics Discussed:  Brain Health:  Healthy aging: Patients were educated on understanding wellness concepts as we age and incorporating holistic and healthful habits may improve outlook and enjoyment of life. An open conversation among group member was facilitated to explore topics related to changes in life that aging brings and offer feedback and wisdom.  Group members were challenged to identify personal warning signs, members of their support system, safety considerations, wellness strategies, and personal interests/hobbies.    Patient Session Goals / Objectives:  ? Described the normal process of aging and discussed the expected changes that it brings within the group  ? Listed ideas for incorporating holistic healthy habits in daily routine  ? Identified important warning signs, member of support system, pertinent safety considerations, wellness strategies, and interest/hobbies       Patient Participation / Response:  Fully participated with the group by sharing personal reflections / insights and openly received / provided feedback with other participants.    Demonstrated understanding of topics discussed through group discussion and participation    Treatment Plan:  Patient has a current master individualized treatment plan.  See Epic treatment plan for more information.    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 Pt was future focused in group, did show some response to humor,  and participated in discussion.  Heather Bauer RN

## 2020-02-17 NOTE — GROUP NOTE
EBP Group Note    PATIENT'S NAME: Toi Mg  MRN:   [id number]  :   1965  ACCT. NUMBER: [id number]  DATE OF SERVICE: 20  START TIME:  2:00 PM  END TIME:  2:55 PM  FACILITATOR: Charlotte Awan LMFT  TOPIC:  EBP Group: Symptom Awareness  55+ Program  TRACK: B1    NUMBER OF PARTICIPANTS: 4    Summary of Group / Topics Discussed:  Symptom Awareness: Mood Disorders: Patients received a general overview of mood disorders including depressive disorders, anxiety disorders, and bipolar disorders and how it relates to their current symptoms. The purpose is to promote understanding of their diagnoses and how it impacts their functioning. Patients reviewed their current awareness of symptoms and diagnoses. Patients received information regarding diagnoses, etiology, cultural, and environmental factors as well as impact on functioning.     Patient Session Goals / Objectives:    Discussed patient individual symptoms and experiences    Reviewed diagnostic criteria and etiology of diagnoses         Patient Participation / Response:  Moderately participated, sharing some personal reflections / insights and adequately adequately received / provided feedback with other participants.    Demonstrated understanding of topics discussed through group discussion and participation and Demonstrated understanding of how information regarding symptoms can assist in management of symptoms    Treatment Plan:  Patient has a current master individualized treatment plan.  See Epic treatment plan for more information.    CHAVA Tenorio

## 2020-02-17 NOTE — GROUP NOTE
"Process Group Note    PATIENT'S NAME: Toi Mg  MRN:   [id number]  :   1965  ACCT. NUMBER: [id number]  DATE OF SERVICE: 20  START TIME:  1:00 PM  END TIME:  1:55 PM  FACILITATOR: Charlotte Awan LMFT  TOPIC:  Process Group    Diagnoses:  296.32 (F33.1) Major Depressive Disorder, Recurrent Episode, Moderate _  300.23 (F40.10) Social Anxiety Disorder  300.01 (F41.0) Panic Disorder      55+ Program  TRACK: B1    NUMBER OF PARTICIPANTS: 4          Data:    Session content: At the start of this group, patients were invited to check in by identifying themselves, describing their current emotional status, and identifying issues to address in this group.   Area(s) of treatment focus addressed in this session included Symptom Management.  Toi processed in group struggling with symptoms of depression and an increase in her pain level. Reports she felt overwhelmed with grief around the anniversary of parents death. Identifies she could not pull herself out of her sadness and felt hopeless. Reports taking a handful of her medications with the idea she could \"meet her parents in heaven\". Reports she ended up sleeping until the next afternoon. Assessed for current safety and SI thoughts and plans. Identifies she feels ashamed of her behavior from over the weekend and reports she will not take those actions again and will reach out to her brother for supports.    Therapeutic Interventions/Treatment Strategies:  Psychotherapist offered support, feedback and validation and reinforced use of skills. Treatment modalities used include Cognitive Behavioral Therapy. Interventions include Other: Discussed ways to increase hopefulness.    Assessment:    Patient response:   Patient responded to session by accepting feedback, listening, accepting support and verbalizing understanding    Possible barriers to participation / learning include: and no barriers identified    Health Issues:   Yes: Pain, Associated " Psychological Distress       Substance Use Review:   Substance Use: No active concerns identified.    Mental Status/Behavioral Observations  Appearance:   Appropriate   Eye Contact:   Good   Psychomotor Behavior: Restless   Attitude:   Cooperative   Orientation:   All  Speech   Rate / Production: Normal    Volume:  Normal   Mood:    Depressed  Sad   Affect:    Flat  Worrisome   Thought Content:   Clear  Thought Form:  Coherent  Logical     Insight:    Poor     Plan:     Safety Plan: Recommended that patient call 911 or go to the local ED should there be a change in any of these risk factors.    Patient consented to co-developed safety plan.  Safety and risk management plan was completed.  Patient agreed to use safety plan should any safety concerns arise.  A copy was given to the patient.     Barriers to treatment: None identified    Patient Contracts (see media tab):  None    Substance Use: Not addressed in session     Continue or Discharge: Patient will continue in 55+ Program (55+) as planned. Patient is likely to benefit from learning and using skills as they work toward the goals identified in their treatment plan.      CHAVA Tenorio  February 18, 2020

## 2020-02-19 ENCOUNTER — HOSPITAL ENCOUNTER (OUTPATIENT)
Dept: BEHAVIORAL HEALTH | Facility: CLINIC | Age: 55
End: 2020-02-19
Attending: PSYCHIATRY & NEUROLOGY
Payer: COMMERCIAL

## 2020-02-19 PROCEDURE — 90853 GROUP PSYCHOTHERAPY: CPT

## 2020-02-19 PROCEDURE — G0177 OPPS/PHP; TRAIN & EDUC SERV: HCPCS

## 2020-02-19 NOTE — GROUP NOTE
Process Group Note    PATIENT'S NAME: Toi Mg  MRN:   [id number]  :   1965  ACCT. NUMBER: [id number]  DATE OF SERVICE: 20  START TIME:  2:00 PM  END TIME:  2:55 PM  FACILITATOR: Charlotte Awan LMFT  TOPIC:  Process Group    Diagnoses:  296.32 (F33.1) Major Depressive Disorder, Recurrent Episode, Moderate _  300.23 (F40.10) Social Anxiety Disorder  300.01 (F41.0) Panic Disorder      55+ Program  TRACK: B1    NUMBER OF PARTICIPANTS: 3          Data:    Session content: At the start of this group, patients were invited to check in by identifying themselves, describing their current emotional status, and identifying issues to address in this group.   Area(s) of treatment focus addressed in this session included Symptom Management.  Toi processed in group her feelings around having suicidal thoughts and meeting with her individual therapist and psychiatrist after her overdose during the previous weekend. Reports she is feeling less grief and depression and connecting with group the helped her to think about having hope for her future. Identifies she connected with her family this week and asked for support from them for when she is feeling low. Reports she wants to go back to work and is struggling to think how she can with her anxiety so high.     Therapeutic Interventions/Treatment Strategies:  Psychotherapist offered support, feedback and validation and reinforced use of skills. Treatment modalities used include Cognitive Behavioral Therapy. Interventions include Cognitive Restructuring:  Explored impact of ineffective thoughts / distortions on mood and activity and Assisted patient in formulating new neutral/positive alternatives to challenge less helpful / ineffective thoughts, Symptoms Management: Promoted understanding of their diagnoses and how it impacts their functioning and Emotions Management:  Reinforced the purpose and biological basis of emotions.    Assessment:    Patient response:    Patient responded to session by accepting feedback, being attentive, accepting support and verbalizing understanding    Possible barriers to participation / learning include: and no barriers identified    Health Issues:   None reported       Substance Use Review:   Substance Use: No active concerns identified.    Mental Status/Behavioral Observations  Appearance:   Appropriate   Eye Contact:   Good   Psychomotor Behavior: Normal   Attitude:   Cooperative   Orientation:   All  Speech   Rate / Production: Normal    Volume:  Normal   Mood:    Sad   Affect:    Worrisome   Thought Content:   Clear  Thought Form:  Coherent  Logical     Insight:    Poor     Plan:     Safety Plan: No current safety concerns identified.  Recommended that patient call 911 or go to the local ED should there be a change in any of these risk factors.     Barriers to treatment: None identified    Patient Contracts (see media tab):  None    Substance Use: Not addressed in session     Continue or Discharge: Patient will continue in 55+ Program (55+) as planned. Patient is likely to benefit from learning and using skills as they work toward the goals identified in their treatment plan.      CHAVA Tenorio  February 19, 2020

## 2020-02-19 NOTE — GROUP NOTE
Life Skills/OT Group Note    PATIENT'S NAME: Toi Mg  MRN:   [id number]  :   1965  ACCT. NUMBER: [id number]  DATE OF SERVICE: 20  START TIME:  1:00 PM  END TIME:  1:50 PM  FACILITATOR: Christian Waggoner OTR/L  TOPIC:  Life Skills Group: Sensory Approaches in Mental Health  55+ Program  TRACK: B1    NUMBER OF PARTICIPANTS: 3    Summary of Group / Topics Discussed:  Sensory Approaches in Mental Health:  Focused Activity: Patients were provided with verbal and experiential education to identify physical and sensorimotor based activities that can be utilized for stress management, self care, health maintenance, and self regulation.  Patients increased knowledge and awareness of activities that support good self care, build resiliency, and prevent relapse through healthy engagement in mindful focused activities for effective coping with illness and reduction of maladaptive coping skills.     Patient Session Goals / Objectives:    Identified specific physical and sensorimotor based activities for stress management, self care, health maintenance, and self regulation      Improved awareness of activities that are meaningful focused activities that support good self care, build resiliency, and prevent relapse and how this applies to current daily life    Established a plan for practice of these skills in their own environments    Practiced and reflected on how to generalize taught skills to their everyday life      Patient Participation / Response:  Fully participated with the group by sharing personal reflections / insights and openly received / provided feedback with other participants.    Patient Presentation: Calm,alert,focused with stable mood and thought process.    Treatment Plan:  Patient has a current master individualized treatment plan.  See Epic treatment plan for more information.    SUSAN Ricketts/WINSTON

## 2020-02-19 NOTE — GROUP NOTE
EBP Group Note    PATIENT'S NAME: Toi Mg  MRN:   [id number]  :   1965  ACCT. NUMBER: [id number]  DATE OF SERVICE: 20  START TIME:  3:00 PM  END TIME:  3:55 PM  FACILITATOR: Charlotte Awan LMFT  TOPIC:  EBP Group: Coping Skills  55+ Program  TRACK: B1    NUMBER OF PARTICIPANTS: 3    Summary of Group / Topics Discussed:  Coping Skills: Relapse Planning: Patients discussed and increased understanding of how anticipating and planning for possible increased symptoms is a proactive way to reduce the likelihood of relapse.  Patients shared individualized factors that may lead to increased symptoms and decompensation in functioning.  Each patient developed a relapse prevention plan designed to help them recognize when they may have increasing symptoms requiring them to take action and notify their key supports and care team.      Patient Session Goals / Objectives:    Identify and understand what factors may contribute to symptom relapse.    Identify actions that may be taken to manage increased symptoms/stressors.    Create an individualized written relapse plan    Problem solve barriers to plan creation and implementation    Share relapse plan with key support people        Patient Participation / Response:  Fully participated with the group by sharing personal reflections / insights and openly received / provided feedback with other participants.    Demonstrated understanding of topics discussed through group discussion and participation, Expressed understanding of the relevance / importance of coping skills at distressing times in life, Demonstrated knowledge of when to consider using a variety of coping skills in daily life, Identified 2-3 positive coping strategies that have helped maintain / improve symptoms in the past and Practiced 2-3 new coping skills in session    Treatment Plan:  Patient has a current master individualized treatment plan.  See Epic treatment plan for more  information.    CHAVA Tenorio

## 2020-02-24 ENCOUNTER — HOSPITAL ENCOUNTER (OUTPATIENT)
Dept: BEHAVIORAL HEALTH | Facility: CLINIC | Age: 55
End: 2020-02-24
Attending: PSYCHIATRY & NEUROLOGY
Payer: COMMERCIAL

## 2020-02-24 PROCEDURE — G0177 OPPS/PHP; TRAIN & EDUC SERV: HCPCS

## 2020-02-24 PROCEDURE — 90853 GROUP PSYCHOTHERAPY: CPT

## 2020-02-24 NOTE — GROUP NOTE
Process Group Note    PATIENT'S NAME: Toi Mg  MRN:   [id number]  :   1965  ACCT. NUMBER: [id number]  DATE OF SERVICE: 20  START TIME:  1:00 PM  END TIME:  1:55 PM  FACILITATOR: Charlotte Awan LMFT  TOPIC:  Process Group    Diagnoses:    296.32 (F33.1) Major Depressive Disorder, Recurrent Episode, Moderate _  300.23 (F40.10) Social Anxiety Disorder  300.01 (F41.0) Panic Disorder    55+ Program  TRACK: B1    NUMBER OF PARTICIPANTS: 3          Data:    Session content: At the start of this group, patients were invited to check in by identifying themselves, describing their current emotional status, and identifying issues to address in this group.   Area(s) of treatment focus addressed in this session included Symptom Management.  Toi processed in group feeling depressed and having no motivation over the weekend to do anything besides watch television. Reported she felt frustrated after meeting with her bariatric doctor on Friday who informed her their team will not be working with her for a year because of her previous overdose attempt. Identifies she believes her mood and perspective would be improved if she was thinner. Identifies she wants to return to work and go to [employer] and allows her anxiety to prevent her from doing those things.      Therapeutic Interventions/Treatment Strategies:  Psychotherapist offered support, feedback and validation and reinforced use of skills. Treatment modalities used include Cognitive Behavioral Therapy. Interventions include Behavioral Activation: Explored how behaviors effect mood and interact with thoughts and feelings and Encouraged strategies to reduce individual procrastination and increase motivation by increasing goal-directed activities to enhance mood and reduce symptoms. and Cognitive Restructuring:  Explored impact of ineffective thoughts / distortions on mood and activity.    Assessment:    Patient response:   Patient responded to session by  listening, interrupting and verbalizing understanding    Possible barriers to participation / learning include: and no barriers identified    Health Issues:   None reported       Substance Use Review:   Substance Use: No active concerns identified.    Mental Status/Behavioral Observations  Appearance:   Appropriate   Eye Contact:   Good   Psychomotor Behavior: Restless   Attitude:   Cooperative   Orientation:   All  Speech   Rate / Production: Normal    Volume:  Normal   Mood:    Anxious   Affect:    Worrisome   Thought Content:   Clear  Thought Form:  Coherent  Logical     Insight:    Fair     Plan:     Safety Plan: Recommended that patient call 911 or go to the local ED should there be a change in any of these risk factors.     Barriers to treatment: None identified    Patient Contracts (see media tab):  None    Substance Use: Not addressed in session     Continue or Discharge: Patient will continue in 55+ Program (55+) as planned. Patient is likely to benefit from learning and using skills as they work toward the goals identified in their treatment plan.      CHAVA Tenorio  February 24, 2020

## 2020-02-24 NOTE — GROUP NOTE
EBP Group Note    PATIENT'S NAME: Toi Mg  MRN:   [id number]  :   1965  ACCT. NUMBER: [id number]  DATE OF SERVICE: 20  START TIME:  2:00 PM  END TIME:  2:55 PM  FACILITATOR: Charlotte Awan LMFT  TOPIC:  EBP Group: Coping Skills  55+ Program  TRACK: B1    NUMBER OF PARTICIPANTS: 3    Summary of Group / Topics Discussed:  Coping Skills: Self-Soothe: Patients learned to apply self-soothe as a way to decrease heightened stress in the moment.  Patients identified situations that necessitate self-soothe strategies.  They focused on ways to manage physical symptoms of distress using the senses. They discussed how to distinguish when this can be useful in their lives when other strategies are more relevant or helpful.    Patient Session Goals / Objectives:    Understand the purpose of using the senses to decrease distress    Process what happens in the body when using self-soothe strategies    Demonstrate understanding of when to use self-soothe strategies    Identify and problem solve barriers to applying self-soothe strategies.    Choose 1-2 self-soothe strategies to apply during times of distress.        Patient Participation / Response:  Moderately participated, sharing some personal reflections / insights and adequately adequately received / provided feedback with other participants.    Demonstrated understanding of topics discussed through group discussion and participation, Expressed understanding of the relevance / importance of coping skills at distressing times in life, Demonstrated knowledge of when to consider using a variety of coping skills in daily life and Committed to using the following techniques in times of distress: distraction and calming strategies    Treatment Plan:  Patient has a current master individualized treatment plan and today was our weekly review of the patient's progress.  See Epic treatment plan for progress / updates on goals and plan.    CHAVA Tenorio

## 2020-02-24 NOTE — GROUP NOTE
RN Group Note    PATIENT'S NAME: Toi Mg  MRN:   [id number]  :   1965  ACCT. NUMBER: [id number]  DATE OF SERVICE: 20  START TIME:  3:00 PM  END TIME:  3:50 PM  FACILITATOR: Heather Bauer RN  TOPIC:  RN Group: Mental Health Maintenance  55+ Program  TRACK: b1    NUMBER OF PARTICIPANTS: 3    Summary of Group / Topics Discussed:  Mental Health Maintenance:  Stigma: In this group patients explored stigma surrounding a mental health diagnosis.  The group discussed the way stigma impacts their own life, and discussed strategies to reduce. The relationship between physical and mental health were also explored in the context of healthcare access, treatment, and support.    Patient Session Goals / Objectives:  ? Patients identified the importance of practicing emotional hygiene  ? Patients identified ways to decrease the  impact of stigma in their own life          Patient Participation / Response:  Fully participated with the group by sharing personal reflections / insights and openly received / provided feedback with other participants.    Demonstrated understanding of topics discussed through group discussion and participation    Treatment Plan:  Patient has a current master individualized treatment plan.  See Epic treatment plan for more information.    Heather Bauer RN

## 2020-02-26 ENCOUNTER — HOSPITAL ENCOUNTER (OUTPATIENT)
Dept: BEHAVIORAL HEALTH | Facility: CLINIC | Age: 55
End: 2020-02-26
Attending: PSYCHIATRY & NEUROLOGY
Payer: COMMERCIAL

## 2020-02-26 PROCEDURE — 90853 GROUP PSYCHOTHERAPY: CPT

## 2020-02-26 PROCEDURE — G0177 OPPS/PHP; TRAIN & EDUC SERV: HCPCS | Performed by: OCCUPATIONAL THERAPIST

## 2020-02-26 NOTE — PROGRESS NOTES
Medical director Cristi Osborne is unavailable to meet with clinical team in February.    Keagan Hernández  (on-site provider) is available to meet with team as needed.    Dr Osborne will meet with the team upon his return in March.

## 2020-02-26 NOTE — GROUP NOTE
EBP Group Note    PATIENT'S NAME: Toi Mg  MRN:   [id number]  :   1965  ACCT. NUMBER: [id number]  DATE OF SERVICE: 20  START TIME:  3:00 PM  END TIME:  3:55 PM  FACILITATOR: Charlotte Awan LMFT  TOPIC:  EBP Group: Behavioral Activation  55+ Program  TRACK: B1    NUMBER OF PARTICIPANTS: 5    Summary of Group / Topics Discussed:  Behavioral Activation: The Change Process - Behavior Change: Patients explored the process and types of change, including but not limited to, theories of change, steps to making change, methods of changing behavior, and potential barriers.  Patients worked to identify what changes may benefit their daily lives, and work towards a plan to implement change.      Patient Session Goals / Objectives:    Demonstrate understanding of the change process.      Identify positive and negative behavioral patterns.    Make plans to track and implement changes and share experiences in group.    Identify personal barriers to change      Patient Participation / Response:  Moderately participated, sharing some personal reflections / insights and adequately adequately received / provided feedback with other participants.    Demonstrated understanding of topics discussed through group discussion and participation, Shared experiences and challenges with making behavioral changes and Identified barriers to change    Treatment Plan:  Patient has a current master individualized treatment plan.  See Epic treatment plan for more information.    CHAVA Tenorio

## 2020-02-26 NOTE — GROUP NOTE
"Process Group Note    PATIENT'S NAME: Toi Mg  MRN:   [id number]  :   1965  ACCT. NUMBER: [id number]  DATE OF SERVICE: 20  START TIME:  2:00 PM  END TIME:  2:55 PM  FACILITATOR: Charlotte Awan LMFT  TOPIC:  Process Group    Diagnoses:  296.32 (F33.1) Major Depressive Disorder, Recurrent Episode, Moderate _  300.23 (F40.10) Social Anxiety Disorder  300.01 (F41.0) Panic Disorder      55+ Program  TRACK: B1    NUMBER OF PARTICIPANTS: 5          Data:    Session content: At the start of this group, patients were invited to check in by identifying themselves, describing their current emotional status, and identifying issues to address in this group.   Area(s) of treatment focus addressed in this session included Symptom Management.  Toi processed in group feeling stuck with her anxiety and depression. Reports she has been having days when she feels hopeful and connecting with important people in her life and days when she is overwhelmed and exhausted from her symptoms. Reports she went to her physical therapist and has noticed some improvement in her mobility. 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     Therapeutic Interventions/Treatment Strategies:  Psychotherapist offered support, feedback and validation and reinforced use of skills. Treatment modalities used include Cognitive Behavioral Therapy. Interventions include Cognitive Restructuring:  Explored impact of ineffective thoughts / distortions on mood and activity and Assisted patient in formulating new neutral/positive alternatives to challenge less helpful / ineffective thoughts and Coping Skills: Discussed how the use of intentional \"in the moment\" actions can help reduce distress.    Assessment:    Patient response:   Patient responded to session by accepting feedback, listening, being attentive, accepting support and " verbalizing understanding    Possible barriers to participation / learning include: and no barriers identified    Health Issues:   None reported       Substance Use Review:   Substance Use: No active concerns identified.    Mental Status/Behavioral Observations  Appearance:   Appropriate   Eye Contact:   Good   Psychomotor Behavior: Restless   Attitude:   Cooperative   Orientation:   All  Speech   Rate / Production: Normal    Volume:  Normal   Mood:    Anxious  Depressed   Affect:    Worrisome   Thought Content:   Clear  Thought Form:  Coherent  Logical     Insight:    Poor     Plan:     Safety Plan: No current safety concerns identified.  Recommended that patient call 911 or go to the local ED should there be a change in any of these risk factors.     Barriers to treatment: None identified    Patient Contracts (see media tab):  None    Substance Use: Not addressed in session     Continue or Discharge: Patient will continue in 55+ Program (55+) as planned. Patient is likely to benefit from learning and using skills as they work toward the goals identified in their treatment plan.      CHAVA Tenorio  February 26, 2020

## 2020-02-26 NOTE — GROUP NOTE
Life Skills/OT Group Note    PATIENT'S NAME: Toi Mg  MRN:   [id number]  :   1965  ACCT. NUMBER: [id number]  DATE OF SERVICE: 20  START TIME:  1:00 PM  END TIME:  1:50 PM  FACILITATOR: Tegan Guevara OTR/L  TOPIC:  Life Skills Group: Sensory Approaches in Mental Health  55+ Program  TRACK: B1    NUMBER OF PARTICIPANTS: 5    Summary of Group / Topics Discussed:  Sensory Approaches in Mental Health:  Focused Activity: Patients were provided with verbal and experiential education to identify physical and sensorimotor based activities that can be utilized for stress management, self care, health maintenance, and self regulation.  Patients increased knowledge and awareness of activities that support good self care, build resiliency, and prevent relapse through healthy engagement in mindful focused activities for effective coping with illness and reduction of maladaptive coping skills.     Patient Session Goals / Objectives:    Identified specific physical and sensorimotor based activities for stress management, self care, health maintenance, and self regulation      Improved awareness of activities that are meaningful focused activities that support good self care, build resiliency, and prevent relapse and how this applies to current daily life    Established a plan for practice of these skills in their own environments    Practiced and reflected on how to generalize taught skills to their everyday life      Patient Participation / Response:  Fully participated with the group by sharing personal reflections / insights and openly received / provided feedback with other participants.    Patient presentation: constricted affect; in slightly less physical pain today; active participant in group, Demonstrated understanding of content through identifying and participating in a new focused activity to try to expand interests and coping options  and Patient would benefit from additional opportunities to  practice the content to be able to generalize it to their everyday life with increased intentionality, consistency, and efficacy in support of their psychiatric recovery    Treatment Plan:  Patient has a current master individualized treatment plan.  See Epic treatment plan for more information.    Tegan Guevara, OTR/L

## 2020-02-28 ENCOUNTER — HOSPITAL ENCOUNTER (OUTPATIENT)
Dept: BEHAVIORAL HEALTH | Facility: CLINIC | Age: 55
End: 2020-02-28
Attending: PSYCHIATRY & NEUROLOGY
Payer: COMMERCIAL

## 2020-02-28 PROCEDURE — G0177 OPPS/PHP; TRAIN & EDUC SERV: HCPCS

## 2020-02-28 PROCEDURE — 90853 GROUP PSYCHOTHERAPY: CPT

## 2020-02-28 PROCEDURE — G0177 OPPS/PHP; TRAIN & EDUC SERV: HCPCS | Performed by: OCCUPATIONAL THERAPIST

## 2020-02-28 NOTE — GROUP NOTE
Process Group Note    PATIENT'S NAME: Toi Mg  MRN:   [id number]  :   1965  ACCT. NUMBER: [id number]  DATE OF SERVICE: 20  START TIME:  2:00 PM  END TIME:  2:50 PM  FACILITATOR: Charlotte Awan LMFT  TOPIC:  Process Group    Diagnoses:  296.32 (F33.1) Major Depressive Disorder, Recurrent Episode, Moderate _  300.23 (F40.10) Social Anxiety Disorder  300.01 (F41.0) Panic Disorder      55+ Program  TRACK: B1    NUMBER OF PARTICIPANTS: 6          Data:    Session content: At the start of this group, patients were invited to check in by identifying themselves, describing their current emotional status, and identifying issues to address in this group.   Area(s) of treatment focus addressed in this session included Symptom Management.  Processed feeling tired over the past couple of days and low motivation. Reports she has been taking small steps like showering on days with little motivation and feeling accomplished from doing her hair and getting ready for group. Reports an increase of pain and she is going to start seeing a pain clinic. Plans to spend the weekend with family to minimize ruminating thoughts and keep mood lifted.    Therapeutic Interventions/Treatment Strategies:  Psychotherapist offered support, feedback and validation and reinforced use of skills. Treatment modalities used include Cognitive Behavioral Therapy. Interventions include Behavioral Activation: Explored how behaviors effect mood and interact with thoughts and feelings and Coping Skills: Reviewed patients current calming practices and discussed a more formal way of practicing and accessing skills and Assisted patient in understanding the purpose of planning / creating / participating / sharing in positive experiences.    Assessment:    Patient response:   Patient responded to session by accepting feedback, listening, accepting support and verbalizing understanding    Possible barriers to participation / learning include: and  no barriers identified    Health Issues:   Yes: Pain, Associated Psychological Distress       Substance Use Review:   Substance Use: No active concerns identified.    Mental Status/Behavioral Observations  Appearance:   Appropriate   Eye Contact:   Good   Psychomotor Behavior: Normal   Attitude:   Cooperative   Orientation:   All  Speech   Rate / Production: Normal    Volume:  Normal   Mood:    Anxious   Affect:    Worrisome   Thought Content:   Clear  Thought Form:  Coherent  Logical     Insight:    Fair     Plan:     Safety Plan: Committed to safety and agreed to follow previously developed safety coping plan.      Barriers to treatment: None identified    Patient Contracts (see media tab):  None    Substance Use: Not addressed in session     Continue or Discharge: Patient will continue in 55+ Program (55+) as planned. Patient is likely to benefit from learning and using skills as they work toward the goals identified in their treatment plan.      CHAVA Tenorio  February 28, 2020

## 2020-02-28 NOTE — GROUP NOTE
RN Group Note    PATIENT'S NAME: Toi Mg  MRN:   [id number]  :   1965  ACCT. NUMBER: [id number]  DATE OF SERVICE: 20  START TIME:  3:00 PM  END TIME:  3:50 PM  FACILITATOR: Ximena Wadsworth RN  TOPIC: MH RN Group: Mental Health Maintenance  55+ Program  TRACK: B1    NUMBER OF PARTICIPANTS: 6    Summary of Group / Topics Discussed:  Mental Health Maintenance:  Stigma: In this group patients explored stigma surrounding a mental health diagnosis.  The group discussed the way stigma impacts their own life, and discussed strategies to reduce. The relationship between physical and mental health were also explored in the context of healthcare access, treatment, and support.    Patient Session Goals / Objectives:  ? Patients identified the importance of practicing emotional hygiene  ? Patients identified ways to decrease the  impact of stigma in their own life          Patient Participation / Response:  Fully participated with the group by sharing personal reflections / insights and openly received / provided feedback with other participants.    Demonstrated understanding of topics discussed through group discussion and participation and Verbalized understanding of mental health maintenance topic    Treatment Plan:  Patient has a current master individualized treatment plan.  See Epic treatment plan for more information.    XIMENA WADSWORTH RN

## 2020-02-28 NOTE — GROUP NOTE
Life Skills/OT Group Note    PATIENT'S NAME: Toi Mg  MRN:   [id number]  :   1965  ACCT. NUMBER: [id number]  DATE OF SERVICE: 20  START TIME:  1:00 PM  END TIME:  1:50 PM  FACILITATOR: Tegan Guevara OTR/L  TOPIC:  Life Skills Group: Sensory Approaches in Mental Health  55+ Program  TRACK: B1    NUMBER OF PARTICIPANTS: 6    Summary of Group / Topics Discussed:  Sensory Approaches in Mental Health:  Coping Through the Senses Introduction: Patients were introduced and taught about neurosensory based skills and strategies related to supporting effective self regulation skills.  Patients were taught about the eight sensory systems and how they can be used for coping with mental health symptoms and stressors.  Patients were provided with an experiential opportunity to increase self-awareness of helpful sensory input and self care activities. Patients were introduced on how to create supportive environments that encourage use of these skills.         Patient Session Goals / Objectives:    Identified specific and individualized neurosensory skills to help when distressed      Identified skills learned and how this applies to current daily life    Established a plan for practice of these skills in their own environments        Patient Participation / Response:  Fully participated with the group by sharing personal reflections / insights and openly received / provided feedback with other participants.    Patient presentation: constricted affect; seemed in more physical pain today with frequent grimacing when moving; active participant in group, Verbalized understanding of content and Patient would benefit from additional opportunities to practice the content to be able to generalize it to their everyday life with increased intentionality, consistency, and efficacy in support of their psychiatric recovery    Treatment Plan:  Patient has a current master individualized treatment plan.  See Epic treatment  plan for more information.    Tegan Guevara, OTR/L

## 2020-03-02 ENCOUNTER — HOSPITAL ENCOUNTER (OUTPATIENT)
Dept: BEHAVIORAL HEALTH | Facility: CLINIC | Age: 55
End: 2020-03-02
Attending: PSYCHIATRY & NEUROLOGY
Payer: COMMERCIAL

## 2020-03-02 PROCEDURE — 90853 GROUP PSYCHOTHERAPY: CPT

## 2020-03-02 PROCEDURE — G0177 OPPS/PHP; TRAIN & EDUC SERV: HCPCS

## 2020-03-02 NOTE — GROUP NOTE
Process Group Note    PATIENT'S NAME: Toi Mg  MRN:   [id number]  :   1965  ACCT. NUMBER: [id number]  DATE OF SERVICE: 3/02/20  START TIME:  1:00 PM  END TIME:  1:55 PM  FACILITATOR: Charlotte Awan LMFT  TOPIC:  Process Group    Diagnoses:  296.32 (F33.1) Major Depressive Disorder, Recurrent Episode, Moderate _  300.23 (F40.10) Social Anxiety Disorder  300.01 (F41.0) Panic Disorder      55+ Program  TRACK: B1    NUMBER OF PARTICIPANTS: 6          Data:    Session content: At the start of this group, patients were invited to check in by identifying themselves, describing their current emotional status, and identifying issues to address in this group.   Area(s) of treatment focus addressed in this session included Symptom Management.  Toi processed feeling stuck and overwhelmed by depression. Reports she again is having passive thoughts of suicide and hopelessness. Assessed for safety and she reports she has no plans just feeling really depressed. Agreed to connect with her family for support. Reports she went to a movie with her brother and enjoyed time with him and when she returned to her apartment feeling stuck again and depressed.     Therapeutic Interventions/Treatment Strategies:  Psychotherapist offered support, feedback and validation and reinforced use of skills. Treatment modalities used include Cognitive Behavioral Therapy. Interventions include Behavioral Activation: Reinforced benefits/challenges of change process through applying skills to replace unwanted behaviors and Explored how behaviors effect mood and interact with thoughts and feelings, Cognitive Restructuring:  Explored impact of ineffective thoughts / distortions on mood and activity and Facilitated recognition of the connection between negative thoughts and negative core beliefs and Coping Skills: Assisted patient in identifying 1-2 healthy distraction skills to reduce overall distress.    Assessment:    Patient response:    Patient responded to session by listening, interrupting and verbalizing understanding    Possible barriers to participation / learning include: and no barriers identified    Health Issues:   None reported       Substance Use Review:   Substance Use: No active concerns identified.    Mental Status/Behavioral Observations  Appearance:   Appropriate   Eye Contact:   Fair   Psychomotor Behavior: Normal   Attitude:   Cooperative   Orientation:   All  Speech   Rate / Production: Normal    Volume:  Normal   Mood:    Depressed  Sad   Affect:    Worrisome   Thought Content:   Clear  Thought Form:  Coherent  Logical     Insight:    Poor     Plan:     Safety Plan: Recommended that patient call 911 or go to the local ED should there be a change in any of these risk factors.    Patient consented to co-developed safety plan.  Safety and risk management plan was completed.  Patient agreed to use safety plan should any safety concerns arise.  A copy was given to the patient.     Barriers to treatment: None identified    Patient Contracts (see media tab):  None    Substance Use: Not addressed in session     Continue or Discharge: Patient will continue in 55+ Program (55+) as planned. Patient is likely to benefit from learning and using skills as they work toward the goals identified in their treatment plan.      CHAVA Tenorio  March 2, 2020

## 2020-03-02 NOTE — GROUP NOTE
Psychoeducation Group Note    PATIENT'S NAME: Toi Mg  MRN:   [id number]  :   1965  ACCT. NUMBER: [id number]  DATE OF SERVICE: 3/02/20  START TIME:  3:00 PM  END TIME:  3:50 PM  FACILITATOR: Heather Bauer RN  TOPIC: MH RN Group: Mental Health Maintenance  55+ Program  TRACK: b1    NUMBER OF PARTICIPANTS: 5    Summary of Group / Topics Discussed:  Mental Health Maintenance:  Stigma: In this group patients explored stigma surrounding a mental health diagnosis.  The group discussed the way stigma impacts their own life, and discussed strategies to reduce. The relationship between physical and mental health were also explored in the context of healthcare access, treatment, and support.    Patient Session Goals / Objectives:  ? Patients identified the importance of practicing emotional hygiene  ? Patients identified ways to decrease the  impact of stigma in their own life          Patient Participation / Response:  Fully participated with the group by sharing personal reflections / insights and openly received / provided feedback with other participants.    Demonstrated understanding of topics discussed through group discussion and participation    Treatment Plan:  Patient has a current master individualized treatment plan.  See Epic treatment plan for more information.    Heather Bauer RN

## 2020-03-02 NOTE — GROUP NOTE
Psychotherapy Group Note    PATIENT'S NAME: Toi Mg  MRN:   [id number]  :   1965  ACCT. NUMBER: [id number]  DATE OF SERVICE: 3/02/20  START TIME:  2:00 PM  END TIME:  2:55 PM  FACILITATOR: Charlotte Awan LMFT  TOPIC:  EBP Group: Self-Awareness  55+ Program  TRACK: B1    NUMBER OF PARTICIPANTS: 6    Summary of Group / Topics Discussed:  Self-Awareness: Self-Compassion: Patients received overview of key concepts in developing self-compassion. Patients discussed mindfulness, self-kindness, and finding common humanity. Patients identified their current approach to problems in their lives and learned skills for increasing self-compassion. Patients identified ways they can put self-compassion skills into practice and problem solve barriers to application of skills.     Patient Session Goals / Objectives:    [place] components of self-compassion    Identify ways to practice self-compassion in daily life    Problem solve barriers to self-compassion practice      Patient Participation / Response:  Minimally participated, only when prompted / asked.    Demonstrated understanding of topics discussed through group discussion and participation and Demonstrated understanding of values, strengths, and challenges to learn about themselves    Treatment Plan:  Patient has a current master individualized treatment plan.  See Epic treatment plan for more information.    CHAVA Tenorio

## 2020-03-04 ENCOUNTER — HOSPITAL ENCOUNTER (OUTPATIENT)
Dept: BEHAVIORAL HEALTH | Facility: CLINIC | Age: 55
End: 2020-03-04
Attending: PSYCHIATRY & NEUROLOGY
Payer: COMMERCIAL

## 2020-03-04 PROCEDURE — 90853 GROUP PSYCHOTHERAPY: CPT

## 2020-03-04 PROCEDURE — G0177 OPPS/PHP; TRAIN & EDUC SERV: HCPCS | Performed by: OCCUPATIONAL THERAPIST

## 2020-03-04 NOTE — GROUP NOTE
Psychotherapy Group Note    PATIENT'S NAME: Toi Mg  MRN:   [id number]  :   1965  ACCT. NUMBER: [id number]  DATE OF SERVICE: 3/04/20  START TIME:  3:00 PM  END TIME:  3:55 PM  FACILITATOR: Charlotte Awan LMFT  TOPIC: MH EBP Group: Cognitive Restructuring  55+ Program  TRACK: B1    NUMBER OF PARTICIPANTS: 4    Summary of Group / Topics Discussed:  Cognitive Restructuring: Distortions: Patients received an overview of how to identify common cognitive distortions. Patients will explore alternatives to cognitive distortions and practice challenging their negative thought patterns. The goal is to help patients target modify ineffective thought patterns.     Patient Session Goals / Objectives:    Familiarized self with ineffective / unhealthy thoughts and how they develop.      Explored impact of ineffective thoughts / distortions on mood and activity    Formulated new neutral/positive alternatives to challenge less helpful / ineffective thoughts.    Practiced and plan to apply in daily life               Patient Participation / Response:  Fully participated with the group by sharing personal reflections / insights and openly received / provided feedback with other participants.    Demonstrated understanding of topics discussed through group discussion and participation, Expressed understanding of the relationship between behaviors, thoughts, and feelings, Demonstrated knowledge of personal thought patterns and how they impact their mood and behavior. and Identified barriers to changing thought patterns    Treatment Plan:  Patient has a current master individualized treatment plan.  See Epic treatment plan for more information.    CHAVA Tenorio

## 2020-03-04 NOTE — GROUP NOTE
Process Group Note    PATIENT'S NAME: Toi Mg  MRN:   [id number]  :   1965  ACCT. NUMBER: [id number]  DATE OF SERVICE: 3/04/20  START TIME:  2:00 PM  END TIME:  2:55 PM  FACILITATOR: Charlotte Awan LMFT  TOPIC:  Process Group    Diagnoses:  296.32 (F33.1) Major Depressive Disorder, Recurrent Episode, Moderate _  300.23 (F40.10) Social Anxiety Disorder  300.01 (F41.0) Panic Disorder      55+ Program  TRACK: B1    NUMBER OF PARTICIPANTS: 5          Data:    Session content: At the start of this group, patients were invited to check in by identifying themselves, describing their current emotional status, and identifying issues to address in this group.   Area(s) of treatment focus addressed in this session included Symptom Management.  Toi processed feelings that came up during her self compassion exercise. Reported being in front of a mirror is always triggering for her and negative self talk about her appearance and weight prevent her from participating in things she enjoys in life. Identifies she is able to see herself in some ways that a friend would describe her with humour and easy going. Identifies when her anxiety is highest she is not able to recognize anything positive.     Therapeutic Interventions/Treatment Strategies:  Psychotherapist offered support, feedback and validation and reinforced use of skills. Treatment modalities used include Cognitive Behavioral Therapy. Interventions include Behavioral Activation: Explored how behaviors effect mood and interact with thoughts and feelings and Cognitive Restructuring:  Explored impact of ineffective thoughts / distortions on mood and activity and Assisted patient in identifying new neutral/positive core beliefs.    Assessment:    Patient response:   Patient responded to session by listening and being attentive    Possible barriers to participation / learning include: and no barriers identified    Health Issues:   None reported       Substance  Use Review:   Substance Use: No active concerns identified.    Mental Status/Behavioral Observations  Appearance:   Appropriate   Eye Contact:   Good   Psychomotor Behavior: Normal   Attitude:   Cooperative   Orientation:   All  Speech   Rate / Production: Normal    Volume:  Normal   Mood:    Anxious  Sad   Affect:    Worrisome   Thought Content:   Clear  Thought Form:  Coherent  Logical     Insight:    Fair     Plan:     Safety Plan: No current safety concerns identified.  Recommended that patient call 911 or go to the local ED should there be a change in any of these risk factors.     Barriers to treatment: None identified    Patient Contracts (see media tab):  None    Substance Use: Not addressed in session     Continue or Discharge: Patient will continue in 55+ Program (55+) as planned. Patient is likely to benefit from learning and using skills as they work toward the goals identified in their treatment plan.      CHAVA Tenorio  March 4, 2020

## 2020-03-05 NOTE — GROUP NOTE
"Psychoeducation Group Note    PATIENT'S NAME: Toi Mg  MRN:   [id number]  :   1965  ACCT. NUMBER: [id number]  DATE OF SERVICE: 3/04/20  START TIME:  1:00 PM  END TIME:  1:50 PM  FACILITATOR: Tegan Guevara OTR/L  TOPIC: MH Life Skills Group: Lifestyle Balance and Structure  55+ Program  TRACK: B1    NUMBER OF PARTICIPANTS: 5    Summary of Group / Topics Discussed:  Lifestyle Balance and Strucure:  Occupations: Patients were provided with an overview on the importance of daily occupations in support of mental health management.  Patients were assisted to establish, restore, and/or modify performance skills and patterns for improved engagement and promotion of positive mental health through meaningful occupations.  Patients gained awareness of the connection between who they are (self identity) with what they do.    Patient Session Goals / Objectives:    Increased awareness of how patient s functioning in identified meaningful occupations are impacted by their mental health status     Developed performance skills and performance patterns to enhance occupational engagement    Explored ways to generalize new awareness and skills to their everyday life        Patient Participation / Response:  Fully participated with the group by sharing personal reflections / insights and openly received / provided feedback with other participants.    Patient presentation: anxious mood; reported feeling \"overwhelmed\"; constricted  affect, Demonstrated understanding of content through engaging in problem solving with this writer to explore potential resources in the community to help address her stressor; reported appreciation for the assistance  and Patient would benefit from additional opportunities to practice the content to be able to generalize it to their everyday life with increased intentionality, consistency, and efficacy in support of their psychiatric recovery    Treatment Plan:  Patient has a current master " individualized treatment plan.  See Epic treatment plan for more information.    Tegan Guevara, OTR/L

## 2020-03-06 ENCOUNTER — HOSPITAL ENCOUNTER (OUTPATIENT)
Dept: BEHAVIORAL HEALTH | Facility: CLINIC | Age: 55
End: 2020-03-06
Attending: PSYCHIATRY & NEUROLOGY
Payer: COMMERCIAL

## 2020-03-06 PROCEDURE — G0177 OPPS/PHP; TRAIN & EDUC SERV: HCPCS

## 2020-03-06 PROCEDURE — G0177 OPPS/PHP; TRAIN & EDUC SERV: HCPCS | Performed by: OCCUPATIONAL THERAPIST

## 2020-03-06 PROCEDURE — 90853 GROUP PSYCHOTHERAPY: CPT

## 2020-03-06 NOTE — GROUP NOTE
"Process Group Note    PATIENT'S NAME: Toi Mg  MRN:   [id number]  :   1965  ACCT. NUMBER: [id number]  DATE OF SERVICE: 3/06/20  START TIME:  2:00 PM  END TIME:  2:55 PM  FACILITATOR: Charlotte Awan LMFT  TOPIC:  Process Group    Diagnoses:  296.32 (F33.1) Major Depressive Disorder, Recurrent Episode, Moderate _  300.23 (F40.10) Social Anxiety Disorder  300.01 (F41.0) Panic Disorder      55+ Program  TRACK: B1    NUMBER OF PARTICIPANTS: 5          Data:    Session content: At the start of this group, patients were invited to check in by identifying themselves, describing their current emotional status, and identifying issues to address in this group.   Area(s) of treatment focus addressed in this session included Symptom Management.  Toi processed feeling stressed thinking about moving out of her apartment. Identifies she was surprised that she received notice about moving and thought she was going to be able to stay on her current lease until august. Reports she is considering living with friend to save money. Reports finances are generally a stressor for her and this pan would minimize that stress. Denies any safety concerns at the moment.     Therapeutic Interventions/Treatment Strategies:  Psychotherapist offered support, feedback and validation and reinforced use of skills. Treatment modalities used include Cognitive Behavioral Therapy. Interventions include Coping Skills: Discussed how the use of intentional \"in the moment\" actions can help reduce distress.    Assessment:    Patient response:   Patient responded to session by accepting feedback and verbalizing understanding    Possible barriers to participation / learning include: and no barriers identified    Health Issues:   None reported       Substance Use Review:   Substance Use: No active concerns identified.    Mental Status/Behavioral Observations  Appearance:   Appropriate   Eye Contact:   Good   Psychomotor Behavior: Normal "   Attitude:   Cooperative   Orientation:   All  Speech   Rate / Production: Normal    Volume:  Normal   Mood:    Depressed   Affect:    Worrisome   Thought Content:   Clear  Thought Form:  Coherent  Logical     Insight:    Fair     Plan:     Safety Plan: No current safety concerns identified.  Recommended that patient call 911 or go to the local ED should there be a change in any of these risk factors.     Barriers to treatment: None identified    Patient Contracts (see media tab):  None    Substance Use: Not addressed in session     Continue or Discharge: Patient will continue in 55+ Program (55+) as planned. Patient is likely to benefit from learning and using skills as they work toward the goals identified in their treatment plan.      CHAVA Tenorio  March 6, 2020

## 2020-03-06 NOTE — GROUP NOTE
Psychoeducation Group Note    PATIENT'S NAME: Toi Mg  MRN:   [id number]  :   1965  ACCT. NUMBER: [id number]  DATE OF SERVICE: 3/06/20  START TIME:  1:00 PM  END TIME:  1:50 PM  FACILITATOR: Tegan Guevara OTR/L  TOPIC: MH Life Skills Group: Sensory Approaches in Mental Health  55+ Program  TRACK:B1    NUMBER OF PARTICIPANTS: 5    Summary of Group / Topics Discussed:  Sensory Approaches in Mental Health:  Self Regulation Through Sensory Modulation:  Patients were provided with education on Autonomic Nervous System activation and taught skills that can be used immediately to help them calm down and regain self control.  Patients were taught how to recognize specific signs and symptoms of their individualized state of arousal and how to make changes when needed.  Patients explored body based skills (bottom up processing skills) and techniques to help manage symptoms and change level of arousal when needed to be in control and comfortable so they are able to function in different environments.       Patient Session Goals / Objectives:    Identified specific and individualized neurosensory skills to help when distressed and for crisis management    Identified signs and symptoms of current level of arousal and ways to make changes in level of arousal when needed    Established a plan for practice of these skills in their own environments        Patient Participation / Response:  Fully participated with the group by sharing personal reflections / insights and openly received / provided feedback with other participants.    Patient presentation: low mood; constricted affect; did not participate verbally in group but was observed to take notes throughout the group and Patient would benefit from additional opportunities to practice the content to be able to generalize it to their everyday life with increased intentionality, consistency, and efficacy in support of their psychiatric recovery    Treatment  Plan:  Patient has a current master individualized treatment plan.  See Epic treatment plan for more information.    Tegan Guevara, OTR/L

## 2020-03-06 NOTE — GROUP NOTE
Psychoeducation Group Note    PATIENT'S NAME: Toi Mg  MRN:   [id number]  :   1965  ACCT. NUMBER: [id number]  DATE OF SERVICE: 3/06/20  START TIME:  3:00 PM  END TIME:  3:50 PM  FACILITATOR: Stella Adrian RN  TOPIC: VARGAS RN Group: Mental Health Maintenance  Adult Mental Health Day Treatment  TRACK: B1    NUMBER OF PARTICIPANTS: 5    Summary of Group / Topics Discussed:  Mental Health Maintenance:  Resiliency     In this group, participants explored the basic tenants of resiliency as learned behaviors, thoughts, and actions that can be developed and enhanced over time. The group learned about what resiliency is and what it isn't and explored the ways in which their past and sources of personal strength have shaped the ways in which they respond and react to life situations. The group was then tasked with exploring ten suggested ways of promoting resiliency in their own life and completed a guided exercise to increase self-awareness and develop goals to foster the development of resiliency        Patient Participation / Response:  Minimally participated, only when prompted / asked. Reported feeling like she was getting a cold.    Verbalized understanding of mental health maintenance topic    Treatment Plan:  Patient has a current master individualized treatment plan.  See Epic treatment plan for more information.    Stella Adrian RN

## 2020-03-11 ENCOUNTER — HOSPITAL ENCOUNTER (OUTPATIENT)
Dept: BEHAVIORAL HEALTH | Facility: CLINIC | Age: 55
End: 2020-03-11
Attending: PSYCHIATRY & NEUROLOGY
Payer: COMMERCIAL

## 2020-03-11 PROCEDURE — G0177 OPPS/PHP; TRAIN & EDUC SERV: HCPCS | Performed by: OCCUPATIONAL THERAPIST

## 2020-03-11 PROCEDURE — 90853 GROUP PSYCHOTHERAPY: CPT

## 2020-03-11 NOTE — GROUP NOTE
Process Group Note    PATIENT'S NAME: Toi Mg  MRN:   [id number]  :   1965  ACCT. NUMBER: [id number]  DATE OF SERVICE: 3/11/20  START TIME:  2:00 PM  END TIME:  2:55 PM  FACILITATOR: Charlotte Awan LMFT  TOPIC:  Process Group    Diagnoses:  296.32 (F33.1) Major Depressive Disorder, Recurrent Episode, Moderate _  300.23 (F40.10) Social Anxiety Disorder  300.01 (F41.0) Panic Disorder      55+ Program  TRACK: B1    NUMBER OF PARTICIPANTS: 6          Data:    Session content: At the start of this group, patients were invited to check in by identifying themselves, describing their current emotional status, and identifying issues to address in this group.   Area(s) of treatment focus addressed in this session included Symptom Management.  Toi processed in group stress from experiencing a couple panic attacks over the past couple days. Reports she was watching a television show that trigger a memory of her spinal surgery and became overwhelmed with fear. Identifies she couldn't breathe and had trouble sleeping that night for fear of nightmares. Identifies she has been feeling up and down with her mood. Reports the morning started out well and she was able to shower and dress in a way that made her feel good.     Therapeutic Interventions/Treatment Strategies:  Psychotherapist offered support, feedback and validation and reinforced use of skills. Treatment modalities used include Cognitive Behavioral Therapy. Interventions include Behavioral Activation: Encouraged strategies to reduce individual procrastination and increase motivation by increasing goal-directed activities to enhance mood and reduce symptoms. and Cognitive Restructuring:  Explored impact of ineffective thoughts / distortions on mood and activity and Assisted patient in identifying new neutral/positive core beliefs.    Assessment:    Patient response:   Patient responded to session by accepting feedback, listening and verbalizing  understanding    Possible barriers to participation / learning include: and no barriers identified    Health Issues:   None reported       Substance Use Review:   Substance Use: No active concerns identified.    Mental Status/Behavioral Observations  Appearance:   Appropriate   Eye Contact:   Good   Psychomotor Behavior: Normal   Attitude:   Cooperative   Orientation:   All  Speech   Rate / Production: Normal    Volume:  Normal   Mood:    Anxious   Affect:    Worrisome   Thought Content:   Clear  Thought Form:  Coherent  Logical     Insight:    Poor     Plan:     Safety Plan: No current safety concerns identified.  Recommended that patient call 911 or go to the local ED should there be a change in any of these risk factors.     Barriers to treatment: None identified    Patient Contracts (see media tab):  None    Substance Use: Not addressed in session     Continue or Discharge: Patient will continue in 55+ Program (55+) as planned. Patient is likely to benefit from learning and using skills as they work toward the goals identified in their treatment plan.      CHAVA Tenorio  March 11, 2020

## 2020-03-11 NOTE — GROUP NOTE
Psychotherapy Group Note    PATIENT'S NAME: Toi Mg  MRN:   [id number]  :   1965  ACCT. NUMBER: [id number]  DATE OF SERVICE: 3/11/20  START TIME:  3:00 PM  END TIME:  3:55 PM  FACILITATOR: Charlotte Awan LMFT  TOPIC:  EBP Group: Emotions Management  55+ Program  TRACK: B1    NUMBER OF PARTICIPANTS: 5    Summary of Group / Topics Discussed:  Emotions Management: Understanding Emotions: Patients discussed the purpose of emotions and function they serve in our lives.  Reviewed core emotions, why they happen (triggers), and how they occur. The group assisted one anothers' understanding that: emotional experiences are important; difficult emotions have a place in our lives; and the differences between various emotions.    Patient Session Goals / Objectives:    Demonstrate understanding of types various emotions    Identify and discuss specific emotions and when they occur; understand triggers    Discuss barriers to emotional regulation      Patient Participation / Response:  Fully participated with the group by sharing personal reflections / insights and openly received / provided feedback with other participants.    Demonstrated understanding of topics discussed through group discussion and participation, Expressed understanding of the relevance / importance of emotions management skills at distressing times in life and Identified barriers to applying emotions management strategies    Treatment Plan:  Patient has a current master individualized treatment plan.  See Epic treatment plan for more information.    CHAVA Tenorio

## 2020-03-12 NOTE — GROUP NOTE
Psychoeducation Group Note    PATIENT'S NAME: Toi Mg  MRN:   [id number]  :   1965  ACCT. NUMBER: [id number]  DATE OF SERVICE: 3/11/20  START TIME:  1:00 PM  END TIME:  1:50 PM  FACILITATOR: Tegan Guevara OTR/L  TOPIC: MH Life Skills Group: Sensory Approaches in Mental Health  55+ Program  TRACK: B1    NUMBER OF PARTICIPANTS: 6    Summary of Group / Topics Discussed:  Sensory Approaches in Mental Health:  Focused Activity: Patients were provided with verbal and experiential education to identify physical and sensorimotor based activities that can be utilized for stress management, self care, health maintenance, and self regulation.  Patients increased knowledge and awareness of activities that support good self care, build resiliency, and prevent relapse through healthy engagement in mindful focused activities for effective coping with illness and reduction of maladaptive coping skills.     Patient Session Goals / Objectives:    Identified specific physical and sensorimotor based activities for stress management, self care, health maintenance, and self regulation      Improved awareness of activities that are meaningful focused activities that support good self care, build resiliency, and prevent relapse and how this applies to current daily life    Established a plan for practice of these skills in their own environments    Practiced and reflected on how to generalize taught skills to their everyday life      Patient Participation / Response:  Moderately participated, sharing some personal reflections / insights and adequately adequately received / provided feedback with other participants.    Patient presentation: constricted affect; observed to be in pain when moving by grimacing/bracing back; anxious and depressed mood; variable concentration and Patient would benefit from additional opportunities to practice the content to be able to generalize it to their everyday life with increased  intentionality, consistency, and efficacy in support of their psychiatric recovery    Treatment Plan:  Patient has a current master individualized treatment plan.  See Epic treatment plan for more information.    Tegan Guevara, OTR/L

## 2020-03-13 ENCOUNTER — HOSPITAL ENCOUNTER (OUTPATIENT)
Dept: BEHAVIORAL HEALTH | Facility: CLINIC | Age: 55
End: 2020-03-13
Attending: PSYCHIATRY & NEUROLOGY
Payer: COMMERCIAL

## 2020-03-13 PROCEDURE — G0177 OPPS/PHP; TRAIN & EDUC SERV: HCPCS | Performed by: OCCUPATIONAL THERAPIST

## 2020-03-13 PROCEDURE — G0177 OPPS/PHP; TRAIN & EDUC SERV: HCPCS

## 2020-03-13 PROCEDURE — 90853 GROUP PSYCHOTHERAPY: CPT

## 2020-03-13 NOTE — GROUP NOTE
Process Group Note    PATIENT'S NAME: Toi Mg  MRN:   [id number]  :   1965  ACCT. NUMBER: [id number]  DATE OF SERVICE: 3/13/20  START TIME:  2:00 PM  END TIME:  2:55 PM  FACILITATOR: Charlotte Awan LMFT  TOPIC:  Process Group    Diagnoses:  296.32 (F33.1) Major Depressive Disorder, Recurrent Episode, Moderate _  300.23 (F40.10) Social Anxiety Disorder  300.01 (F41.0) Panic Disorder      55+ Program  TRACK: B1    NUMBER OF PARTICIPANTS: 6          Data:    Session content: At the start of this group, patients were invited to check in by identifying themselves, describing their current emotional status, and identifying issues to address in this group.   Area(s) of treatment focus addressed in this session included Symptom Management.  Toi processed feeling positive about her appointment with pain clinic and reported her provider was kind and offered her some helpful information to manage pain. She feels hopeful that going to the clinic will eluviate her chronic pain symptoms. Reports she contacted her son and had a positive conversation with him regarding his health and connecting with the family. Reports she is going to start attending family therapy with her son.      Therapeutic Interventions/Treatment Strategies:  Psychotherapist offered support, feedback and validation and reinforced use of skills. Treatment modalities used include Cognitive Behavioral Therapy. Interventions include Behavioral Activation: Encouraged strategies to reduce individual procrastination and increase motivation by increasing goal-directed activities to enhance mood and reduce symptoms. and Relationship Skills: Assisted patients in implementing more effective communication skills in their relationships and Discussed strategies to promote healthier understanding of interpersonal relationships.    Assessment:    Patient response:   Patient responded to session by accepting feedback, accepting support and verbalizing  understanding    Possible barriers to participation / learning include: and no barriers identified    Health Issues:   None reported       Substance Use Review:   Substance Use: No active concerns identified.    Mental Status/Behavioral Observations  Appearance:   Appropriate   Eye Contact:   Good   Psychomotor Behavior: Normal   Attitude:   Cooperative   Orientation:   All  Speech   Rate / Production: Normal    Volume:  Normal   Mood:    Normal  Affect:    Worrisome   Thought Content:   Clear  Thought Form:  Coherent  Logical     Insight:    Fair     Plan:     Safety Plan: No current safety concerns identified.  Recommended that patient call 911 or go to the local ED should there be a change in any of these risk factors.     Barriers to treatment: None identified    Patient Contracts (see media tab):  None    Substance Use: Not addressed in session     Continue or Discharge: Patient will continue in 55+ Program (55+) as planned. Patient is likely to benefit from learning and using skills as they work toward the goals identified in their treatment plan.      CHAVA Tenorio  March 13, 2020

## 2020-03-13 NOTE — GROUP NOTE
Psychoeducation Group Note    PATIENT'S NAME: Toi Mg  MRN:   [id number]  :   1965  ACCT. NUMBER: [id number]  DATE OF SERVICE: 3/13/20  START TIME:  3:00 PM  END TIME:  3:50 PM  FACILITATOR: Ximena Wadsworth RN  TOPIC: MH RN Group: Medication Education and Management  55+ Program  TRACK: B1    NUMBER OF PARTICIPANTS: 5    Summary of Group / Topics Discussed:  Medication Educations and Management:  Medication Categories: Patient were provided with a brief overview of four major psychotropic medication categories. Expected effects, potential side effects, adverse reactions, and contraindications were discussed. Patients learned about how their medications work to treat their illness and reviewed safe medication management, handling, and disposal of medications.     Patient Session Goals / Objectives:  ? Listed the four major categories of psychotropic medications (antidepressants, antipsychotics, mood stabilizers, anti-anxiety)  ? Identified medications in each category and important adverse reactions/contraindications for use  ? Explained how the medications they take work to treat their illness       Patient Participation / Response:  Fully participated with the group by sharing personal reflections / insights and openly received / provided feedback with other participants.     Demonstrated understanding of topics discussed through group discussion and participation and Verbalized understanding of medication education and management topic    Treatment Plan:  Patient has a current master individualized treatment plan.  See Epic treatment plan for more information.    XIMENA WADSWORTH RN

## 2020-03-13 NOTE — GROUP NOTE
Psychoeducation Group Note    PATIENT'S NAME: Toi Mg  MRN:   [id number]  :   1965  ACCT. NUMBER: [id number]  DATE OF SERVICE: 3/13/20  START TIME:  1:00 PM  END TIME:  1:50 PM  FACILITATOR: Tegan Guevara OTR/L  TOPIC: MH Life Skills Group: Lifestyle Balance and Structure  55+ Program  TRACK: B1    NUMBER OF PARTICIPANTS: 6    Summary of Group / Topics Discussed:  Lifestyle Balance and Strucure:  Benefits of Leisure on Mental Health: Patients explored and learned about the benefits and possibilities of leisure activity to create lifestyle balance that supports their mental and physical wellbeing.  Patients were assisted to identify individualized leisure values and interests, recognized the benefits of leisure activity on mental health, and problem solved barriers to leisure engagement and strategies to overcome.  Patient engaged in an experiential leisure activity to gain self-awareness and build milieu social aspects and reflected on the impact the experiential activity had on their mood.       Patient Session Goals / Objectives:    Increased awareness of the importance of engagement in leisure activities to support lifestyle balance and perceived quality of life    Identified strategies to recognize and challenge barriers to leisure participation     Facilitated exploration of meaningful leisure interests and values    Practiced and reflected on how to generalize taught skills to their everyday life        Patient Participation / Response:  Moderately participated, sharing some personal reflections / insights and adequately adequately received / provided feedback with other participants.    Patient presentation: constricted affect; low mood; quiet in group but attentive to discussion, Demonstrated understanding of content through participating in discussion related to leisure interests  and Patient would benefit from additional opportunities to practice the content to be able to generalize it to  their everyday life with increased intentionality, consistency, and efficacy in support of their psychiatric recovery    Treatment Plan:  Patient has a current master individualized treatment plan.  See Epic treatment plan for more information.    Tegan Guevara, OTR/L

## 2020-03-16 ENCOUNTER — HOSPITAL ENCOUNTER (OUTPATIENT)
Dept: BEHAVIORAL HEALTH | Facility: CLINIC | Age: 55
End: 2020-03-16
Attending: PSYCHIATRY & NEUROLOGY
Payer: COMMERCIAL

## 2020-03-16 PROCEDURE — 90834 PSYTX W PT 45 MINUTES: CPT

## 2020-03-16 NOTE — PROGRESS NOTES
Current Outpatient Medications:      acetaminophen (TYLENOL) 500 MG tablet, Take 500-1,000 mg by mouth every 6 hours as needed for mild pain, Disp: , Rfl:      ARIPiprazole (ABILIFY) 2 MG tablet, Take 2 mg by mouth daily, Disp: , Rfl:      Aspirin-Acetaminophen-Caffeine (EXCEDRIN PO), , Disp: , Rfl:      atenolol-chlorthalidone (TENORETIC) 100-25 MG tablet, Take 1 tablet by mouth once daily (Patient not taking: Reported on 12/17/2019), Disp: 30 tablet, Rfl: 0     clonazePAM (KLONOPIN) 0.5 MG tablet, clonazepam 0.5 mg tablet, Disp: , Rfl:      cyclobenzaprine (FLEXERIL) 5 MG tablet, Take 5 mg by mouth as needed, Disp: , Rfl:      Ferrous Sulfate (IRON SUPPLEMENT PO), Take 65 mg by mouth daily, Disp: , Rfl:      gabapentin (NEURONTIN) 100 MG capsule, Take 100 mg by mouth 3 times daily, Disp: , Rfl:      hydrOXYzine (ATARAX) 25 MG tablet, Take 25 mg by mouth 3 times daily as needed for itching, Disp: , Rfl:      ibuprofen 200 MG capsule, Take 800 mg by mouth every 4 hours as needed for fever, Disp: , Rfl:      losartan-hydrochlorothiazide (HYZAAR) 50-12.5 MG tablet, Take 1 tablet by mouth daily, Disp: , Rfl:      multivitamin, therapeutic with minerals (MULTI-VITAMIN) TABS tablet, Take 1 tablet by mouth daily, Disp: , Rfl:      SENNA-docusate sodium (SENNA S) 8.6-50 MG tablet, Take 3 tablets by mouth, Disp: , Rfl:      traMADol (ULTRAM) 50 MG tablet, tramadol 50 mg tablet, Disp: , Rfl:      venlafaxine (EFFEXOR-XR) 150 MG 24 hr capsule, Take 1 capsule (150 mg) by mouth daily (Patient taking differently: Take 300 mg by mouth daily 2 tabs), Disp: 30 capsule, Rfl: 3  No current facility-administered medications for this encounter.     Facility-Administered Medications Ordered in Other Encounters:      sodium chloride (PF) 0.9% PF flush 3 mL, 3 mL, Intracatheter, Q8H, Nikki Carver MD, 10 mL at 06/29/15 1818  Psychiatry staffing: case discussed  Diagnosis:  MDD, PTSD, anxiety.  Doing better.  Pain clinic started,  feeling better. Improved from 3 weeks ago with more support.

## 2020-03-17 ENCOUNTER — HOSPITAL ENCOUNTER (OUTPATIENT)
Dept: BEHAVIORAL HEALTH | Facility: CLINIC | Age: 55
End: 2020-03-17
Attending: PSYCHIATRY & NEUROLOGY
Payer: COMMERCIAL

## 2020-03-17 ENCOUNTER — TELEPHONE (OUTPATIENT)
Dept: BEHAVIORAL HEALTH | Facility: CLINIC | Age: 55
End: 2020-03-17

## 2020-03-17 NOTE — TELEPHONE ENCOUNTER
Called Patient today to discuss the program specifics related to the COVID-19 interim plan and to review the following:  Start 3:00pm  Stop 3: 10pm      Check-in (re: any symptoms, concerns, safety, etc)  Doing well, met with therapist today and has no safety concerns. Feels comfortable being at home with her cats and watching her favorite shows. Reports she is open to individual therapy call and would prefer to be called between 10-11am       Ask [place] Questions (2)  1. Have you wished you were dead or wished you could go to sleep and not wake up? no  2. Have you actually had any thoughts of killing yourself? no  Proceed onto additional questions if needed.       Do you have a copy of your Safety Plan?  yes     Was Patient provided with crisis phone numbers? yes      Do you have a phone?  # to reach you? Y/N  Checked to make sure the phone number listed is accurate and reminded Patient to check and clear voicemail messages.  o Is it a smart phone? no  o Do you have limitations on minutes or data that might prevent use? yes  o Do you have a laptop or computer with a web cam? No   o Do you have Wifi or sufficient cellular data? yes  o What other services or supports do you have in place that you still have access to?  - Psychiatrist? yes  - Individual Therapist? yes  - Supportive Living Situation? No   - ? No   - [facility] worker? No   - Other?         Patient was informed of the following information:     Informed patient that we are no longer providing services on site due to COVID-19 until further notice.  Instructed them to not enter the facility unless they are in need of emergency services (Go to ED).    Reassured them that they are important to us and we will do whatever we can to continue to provide service and support to them during this time.     o We will contact you again tomorrow to check in and update you on our progress. If you have any concerns please call the program.  o If you need more  immediate assistance, please go to nearest emergency department or call crisis phone number.    o If you have COVID-19 concerns, please contact [email or web address] or call [phone]

## 2020-03-18 ENCOUNTER — HOSPITAL ENCOUNTER (OUTPATIENT)
Dept: BEHAVIORAL HEALTH | Facility: CLINIC | Age: 55
End: 2020-03-18
Attending: PSYCHIATRY & NEUROLOGY
Payer: COMMERCIAL

## 2020-03-23 ENCOUNTER — HOSPITAL ENCOUNTER (OUTPATIENT)
Dept: BEHAVIORAL HEALTH | Facility: CLINIC | Age: 55
End: 2020-03-23
Attending: PSYCHIATRY & NEUROLOGY
Payer: COMMERCIAL

## 2020-03-23 NOTE — PROGRESS NOTES
"Toi Mg is a 54 year old female who is being evaluated via a billable telephone visit.      The patient has been notified of following:     \"This telephone visit will be conducted via a call between you and your clinician. We have found that certain health care needs can be provided without the need for a physical exam.  This service lets us provide the care you need with a phone conversation.\"     D.  Processed feelings around anxious thoughts and struggling with not having structure in the day. Identifies isolating is a normal thing for her but not being able to attend group is a struggle. Reports she is sad that she does not have things to look forward to. Identifies stress from pain and is grateful she continues to meet with pain clinic. Reports pain is managed better and she remains hopefull she will be able to work once its managed.   I. Behavioral Activation: Reinforced benefits/challenges of change process through applying skills to replace unwanted behaviors and Explored how behaviors effect mood and interact with thoughts and feelings, Cognitive Restructuring:  Explored impact of ineffective thoughts / distortions on mood and activity and Facilitated recognition of the connection between negative thoughts and negative core beliefs and Coping Skills: Assisted patient in identifying 1-2 healthy distraction skills to reduce overall distress.  A. No current safety concerns.   P. Scheduled next session with therapist and OT. Will meet with individual therapist this week as well.     Assessment/Plan:  296.32 (F33.1) Major Depressive Disorder, Recurrent Episode, Moderate _  300.23 (F40.10) Social Anxiety Disorder  300.01 (F41.0) Panic Disorder        I have reviewed the note as documented above.  This accurately captures the substance of my conversation with the patient.      Phone call contact time  Call Started at 1:00 pm  Call Ended at 1:30 pm    CHAVA Tenorio   "

## 2020-03-27 ENCOUNTER — HOSPITAL ENCOUNTER (OUTPATIENT)
Dept: BEHAVIORAL HEALTH | Facility: CLINIC | Age: 55
End: 2020-03-27
Attending: PSYCHIATRY & NEUROLOGY
Payer: COMMERCIAL

## 2020-03-27 NOTE — PROGRESS NOTES
"55+ Program  TRACK: B1    PATIENT'S NAME: Toi Mg  MRN:   [id number]  :   1965  ACCT. NUMBER: [id number]  DATE OF SERVICE: 3/27/20  START TIME: 903  END TIME: 926    NUMBER OF PARTICIPANTS: 1  Toi Mg is a 54 year old female who is being evaluated via a telephone visit.      The patient has been notified of the following:     \"We have found that certain health care needs can be provided without the need for a face to face visit.  This service lets us provide the care you need with a short phone conversation.      I will have full access to your [place] medical record during this entire phone call.   I will be taking notes for your medical record.     Since this is like an office visit, we will bill your insurance company for this service.      There are potential benefits and risks of telephone visits (e.g. limits to patient confidentiality) that differ from in-person visits. Confidentiality still applies for telephone services, and nobody will record the visit.  It is important to be in a quiet, private space that is free of distractions (including cell phone or other devices) during the visit.     If during the course of the call I believe a telephone visit is not appropriate, you will not be charged for this service\"    Consent has been obtained for this service by care team member: yes.        Summary of  Topics Discussed:  Communication and Social Skills Development: Social Supports: Reaching Out: Patient was taught and gained awareness of the importance of asking for help from other people as a way to expand their support network.  Patient identified both personal strengths and barriers in asking for help.  Patient was provided with skills and opportunity to practice asking for help to improve overall communication and connection with other people.      Patient Session Goals / Objectives:    Identified strengths and opportunities for growth in asking for help and how this impacts " "their ability to clearly communicate needs to other people       Improved awareness of important aspects of asking for help and support and how this relates to mental health recovery        Established a plan for practice of these skills in their own environments    Practiced and reflected on how to generalize taught skills to their everyday life    Patient Participation / Response:  Fully participated in the discussion by sharing personal reflections / insights and openly received / provided feedback.    Verbalized understanding of content and Patient would benefit from additional opportunities to practice the content to be able to generalize it to their everyday life with increased intentionality, consistency, and efficacy in support of their psychiatric recovery     Toi reported she was doing \"fine.\"  Endorsed some increase in physical pain but is managing and was able to identify skills she is using.  No safety concerns reported.      Treatment Plan:  Patient has a current master individualized treatment plan.  See Epic treatment plan for more information.            "

## 2020-03-30 ENCOUNTER — HOSPITAL ENCOUNTER (OUTPATIENT)
Dept: BEHAVIORAL HEALTH | Facility: CLINIC | Age: 55
End: 2020-03-30
Attending: PSYCHIATRY & NEUROLOGY
Payer: COMMERCIAL

## 2020-03-30 PROCEDURE — 90832 PSYTX W PT 30 MINUTES: CPT | Mod: 95

## 2020-03-30 NOTE — PROGRESS NOTES
"Toi Mg is a 54 year old female who is being evaluated via a telephone visit.      The patient has been notified of the following:     \"We have found that certain health care needs can be provided without the need for a face to face visit.  This service lets us provide the care you need with a short phone conversation.      I will have full access to your [place] medical record during this entire phone call.   I will be taking notes for your medical record.     Since this is like an office visit, we will bill your insurance company for this service.      There are potential benefits and risks of telephone visits (e.g. limits to patient confidentiality) that differ from in-person visits. Confidentiality still applies for telephone services, and nobody will record the visit.  It is important to be in a quiet, private space that is free of distractions (including cell phone or other devices) during the visit.     If during the course of the call I believe a telephone visit is not appropriate, you will not be charged for this service\"    Consent has been obtained for this service by care team member: yes.  55+ Program  TRACK: B1    PATIENT'S NAME: Toi Mg  MRN:   [id number]  :   1965  ACCT. NUMBER: [id number]  DATE OF SERVICE: 3/30/20  START TIME: 10:00am   END TIME: 10:30 am       NUMBER OF PARTICIPANTS: 1    Diagnoses:  296.32 (F33.1) Major Depressive Disorder, Recurrent Episode, Moderate _  300.23 (F40.10) Social Anxiety Disorder  300.01 (F41.0) Panic Disorder      Data:    Session content: At the start of this session, patient was invited to check in by identifying themselves, describing their current emotional status, and identifying issues to address in this group.   Area(s) of treatment focus addressed in this session included Symptom Management.  Processed feeling down and at times hopeless. Identifies when she watches too much news her feelings of hopelessness increase. Identifies " "finances as a stressor and reports her brother has offered her to live with him so she can save money. Reports she is feeling more that she wants to return to work and that she thinks her physical stamina & pain is managed where she could start worknig part time and would be able to manage.Reports she has not left her apartment but does spend time outside on her outdoor patio to get fresh air and \"listen to the birds.      Therapeutic Interventions/Treatment Strategies:  Psychotherapist offered support, feedback and validation and reinforced use of skills. Treatment modalities used include Cognitive Behavioral Therapy. Interventions include Behavioral Activation: Explored how behaviors effect mood and interact with thoughts and feelings.    Assessment:    Patient response:   Patient responded to session by accepting feedback, listening, focusing on goals and verbalizing understanding    Possible barriers to participation / learning include: and no barriers identified    Health Issues:   None reported       Substance Use Review:   Substance Use: No active concerns identified.    Mental Status/Behavioral Observations  Appearance:   Appropriate   Eye Contact:   Good   Psychomotor Behavior: Normal   Attitude:   Cooperative   Orientation:   All  Speech   Rate / Production: Normal    Volume:  Normal   Mood:    Sad   Affect:    Worrisome   Thought Content:   Clear  Thought Form:  Coherent  Logical     Insight:    Fair     Plan:     Safety Plan: Recommended that patient call 911 or go to the local ED should there be a change in any of these risk factors.     Barriers to treatment: None identified    Patient Contracts (see media tab):  None    Substance Use: Not addressed in session     Continue or Discharge: Patient will continue in 55+ Program (55+) as planned. Patient is likely to benefit from learning and using skills as they work toward the goals identified in their treatment plan.      CHAVA Tenorio  March 30, " 2020

## 2020-04-30 ENCOUNTER — TELEPHONE (OUTPATIENT)
Dept: BEHAVIORAL HEALTH | Facility: CLINIC | Age: 55
End: 2020-04-30

## 2020-08-12 ENCOUNTER — HOSPITAL ENCOUNTER (OUTPATIENT)
Dept: BEHAVIORAL HEALTH | Facility: CLINIC | Age: 55
Discharge: HOME | End: 2020-08-12
Attending: FAMILY MEDICINE | Admitting: FAMILY MEDICINE
Payer: COMMERCIAL

## 2020-08-12 PROCEDURE — 90791 PSYCH DIAGNOSTIC EVALUATION: CPT | Mod: 95 | Performed by: PSYCHOLOGIST

## 2020-08-12 ASSESSMENT — ANXIETY QUESTIONNAIRES
2. NOT BEING ABLE TO STOP OR CONTROL WORRYING: NEARLY EVERY DAY
1. FEELING NERVOUS, ANXIOUS, OR ON EDGE: NEARLY EVERY DAY
6. BECOMING EASILY ANNOYED OR IRRITABLE: NOT AT ALL
IF YOU CHECKED OFF ANY PROBLEMS ON THIS QUESTIONNAIRE, HOW DIFFICULT HAVE THESE PROBLEMS MADE IT FOR YOU TO DO YOUR WORK, TAKE CARE OF THINGS AT HOME, OR GET ALONG WITH OTHER PEOPLE: EXTREMELY DIFFICULT
7. FEELING AFRAID AS IF SOMETHING AWFUL MIGHT HAPPEN: NEARLY EVERY DAY
5. BEING SO RESTLESS THAT IT IS HARD TO SIT STILL: NOT AT ALL
GAD7 TOTAL SCORE: 13
3. WORRYING TOO MUCH ABOUT DIFFERENT THINGS: NEARLY EVERY DAY

## 2020-08-12 ASSESSMENT — PATIENT HEALTH QUESTIONNAIRE - PHQ9
SUM OF ALL RESPONSES TO PHQ QUESTIONS 1-9: 18
5. POOR APPETITE OR OVEREATING: SEVERAL DAYS
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
## 2020-08-12 NOTE — PROGRESS NOTES
Outpatient Mental Health Services - Adult    MY COPING PLAN FOR SAFETY    PATIENT'S NAME: Toi Mg  MRN:   [id number]  SAFETY PLAN:  Step 1: Warning signs / cues (Thoughts, images, mood, situation, behavior) that a crisis may be developing:  ? Thoughts: intrusive thoughts of death  ? Thinking Processes: negative ruminating, just wants to be done with the stress  ? Mood: irritability  ? Behaviors: isolating/withdrawing   ? Situations: anniversary of parents' deaths   Step 2: Coping strategies - Things I can do to take my mind off of my problems without contacting another person (relaxation technique, physical activity):  ? Distress Tolerance Strategies:  pray  ? Physical Activities: deep breathing  ? Focus on helpful thoughts:  imagery, remind myself I am not the only one going through this  Step 3: People and social settings that provide distraction:                 Name: brother                  none current        Step 4: Remind myself of people and things that are important to me and worth living for:  Her son  Step 5: When I am in crisis, I can ask these people to help me use my safety plan:                 Name: siblings and their spouses                Step 6: Making the environment safe:   ? be around others  ? Get rid of old medication  Step 7: Professionals or agencies I can contact during a crisis:  ? Suicide Prevention Lifeline: 8-810-864-TALK (0533)  ? Crisis Text Line Service (available 24 hours a day, 7 days a week): Text [place] to 529547  ? Call  **CRISIS (672689) from a cell phone to talk to a team of professionals who can help you.       Crisis Services By [facility]: Phone Number:   [place]     [phone]   [place]    [phone]   [place]    [phone]   [place]    [phone]   [place]    [phone]   [place] [phone]   [place]     [phone]      ? Call 621 or go to my nearest emergency department.              I helped develop this safety plan and agree to use it when needed.  I  have been given a copy of this plan.       Client signature _________________________________________________________________  Today s date:  12/17/2019  Adapted from Safety Plan Template 2008 Mitzy Kenny and Dylan Olguin is reprinted with the express permission of the authors.  No portion of the Safety Plan Template may be reproduced without the express, written permission.  You can contact the authors at [email or web address] or [email or web address].                        '

## 2020-08-13 ASSESSMENT — ANXIETY QUESTIONNAIRES: GAD7 TOTAL SCORE: 13

## 2020-08-25 PROBLEM — F33.1 MDD (MAJOR DEPRESSIVE DISORDER), RECURRENT EPISODE, MODERATE (H): Status: ACTIVE | Noted: 2020-08-25

## 2021-02-28 NOTE — TELEPHONE ENCOUNTER
Reason for call:  Other   Patient called regarding (reason for call): call back  Additional comments: Laurie is confirming that Dr. Sunshine will follow the patient for home and health care.    Phone number to reach patient:  Other phone number:  [phone] [facility]*    Best Time:  Anytime    Can we leave a detailed message on this number?  YES     None

## 2021-06-02 VITALS — WEIGHT: 199 LBS | BODY MASS INDEX: 34.7 KG/M2

## 2021-06-18 NOTE — LETTER
Letter by Sterling Groves MD at      Author: Sterling Groves MD Service: -- Author Type: --    Filed:  Encounter Date: 1/3/2019 Status: (Other)         Patient: Toi Mg   MR Number: [id number]   YOB: 1965   Date of Visit: 1/3/2019     [facility]    Facility:   [facility] [[id number]]   Code Status: FULL CODE  PCP: Lashonda Sunshine MD   Phone: [phone]   Fax: [phone]      CHIEF COMPLAINT/REASON FOR VISIT:  Chief Complaint   Patient presents with   ? Discharge Summary       HISTORY COURSE:  Toi is a 53 y.o. female who was hospitalized with back pain and fever to 104.  Symptoms progressed to include right shoulder and arm pain.  MRI scan showed a fluid collection suggestive of epidural abscess extending from C5-C6 7.  She was started on broad-spectrum antibiotics.  Neurosurgery and infectious disease were consulted.  She underwent surgery including C7 and C1 discectomy and fusion and drainage of the abscess.  OTILIA did not show evidence for bowel adhesions.  She will continue with IV antibiotics until 1/21/19 on nafcillin.    Subsequently she was seen by her neurosurgeon for follow-up while she was at the transitional care unit and she was having additional pain.  Further studies showed worsening of epidural abscess and she had further procedures surgically and plans for IV nafcillin for a total of 6 weeks.    Upon current review of systems she is feeling much better.  She is not having fevers or chills.  She is able to walk well compared to previously.  She continues to have pain requiring OxyContin 10 mg every 12 hours and oxycodone 5 mg 1-2 tablets every 4 hours as needed.  She has underlying anxiety for which she uses Klonopin 0.5 mg 1/2-1 tablet daily as needed for panic.  Prescriptions were given for discharge of 10 pills of Klonopin, 20 pills of oxycodone, and 20 pills of OxyContin to cover until she has follow-up with her primary care and  specialists.  In other review of systems she is not experiencing chest pain or palpitations of the heart.  She does not have cough or shortness of breath.  She does not have abdominal pain or nausea and bowels are slightly loose and for this reason probiotic was started at this time with lactobacillus of 15 billion twice daily.  I explained that this should be continued for 2 weeks past the time of antibiotic administration.         She has other medical history of status post gastric bypass, depression, anxiety, vitamin B12 deficiency and intestinal malabsorption.    Review of Systems    Vitals:    01/02/19 1804   BP: 161/86   Pulse: (!) 110   Resp: 20   Temp: 98.2  F (36.8  C)   SpO2: 94%       Physical Exam   Constitutional: No distress.   Eyes: Right eye exhibits no discharge. Left eye exhibits no discharge.   Neck: No JVD present.   Cardiovascular: Normal heart sounds.   Pulmonary/Chest: Breath sounds normal. No respiratory distress.   Abdominal: Soft. Bowel sounds are normal. She exhibits no distension. There is no tenderness.   Neurological: She is alert.   Psychiatric: She has a normal mood and affect.   Nursing note and vitals reviewed.      MEDICATION LIST:  Current Outpatient Medications   Medication Sig   ? acetaminophen (TYLENOL) 500 MG tablet Take 1,000 mg by mouth 4 (four) times a day.   ? clonazePAM (KLONOPIN) 0.5 MG tablet Take 0.25-0.5 mg by mouth daily as needed for anxiety (Give 0.25 mg for moderate panic attacks, give 0.5 mg for severe panic attacks).          ? hydrOXYzine pamoate (VISTARIL) 50 MG capsule Take 50 mg by mouth every 4 (four) hours as needed for itching or anxiety.          ? methocarbamol (ROBAXIN) 500 MG tablet Take 1,000 mg by mouth 4 (four) times a day.   ? nafcillin 1 gram/50 mL IVPB Infuse 2 g into a venous catheter every 4 (four) hours.   ? oxyCODONE (OXYCONTIN) 10 mg 12 hr tablet Take 10 mg by mouth every 12 (twelve) hours.          ? oxyCODONE (ROXICODONE) 5 MG immediate  release tablet Take 5-10 mg by mouth every 4 (four) hours as needed for pain (10 mg for pain rated 6-10/10 and 5 mg for pain rated 1-5/10).          ? polyethylene glycol (MIRALAX) 17 gram packet Take 17 g by mouth as needed.   ? senna-docusate (SENNOSIDES-DOCUSATE SODIUM) 8.6-50 mg tablet Take 3 tablets by mouth 2 (two) times a day.   ? venlafaxine 225 mg TR24 Take 225 mg by mouth daily.       DISCHARGE DIAGNOSIS:    ICD-10-CM    1. Epidural abscess G06.2    2. Status post cervical spinal fusion Z98.1    3. History of gastric bypass Z98.84    4. Depression, unspecified depression type F32.9    5. Anxiety F41.9        MEDICAL EQUIPMENT NEEDS:  She does require a walker but otherwise does not have specialized durable medical equipment needs    DISCHARGE PLAN/FACE TO FACE:  I certify that services are/were furnished while this patient was under the care of a physician and that a physician or an allowed non-physician practitioner (NPP), had a face-to-face encounter that meets the physician face-to-face encounter requirements. The encounter was in whole, or in part, related to the primary reason for home health. The patient is confined to his/her home and needs intermittent skilled nursing, physical therapy, speech-language pathology, or the continued need for occupational therapy. A plan of care has been established by a physician and is periodically reviewed by a physician.  Date of Face-to-Face Encounter: 1/3/19    I certify that, based on my findings, the following services are medically necessary home health services: Home occupational therapy, home physical therapy, , skilled nursing, home health aide.    My clinical findings support the need for the above skilled services because: (Please write a brief narrative summary that describes what the RN, PT, SLP, or other services will be doing in the home. A list of diagnoses in this section does not meet the CMS requirements.)  Skilled nursing visits are  for medication set up and teaching, symptom and disease process monitoring and education.  Home health aide services are for bathing and ADL needs.  Home physical therapy is to evaluate and treat for strengthening, balance, endurance, and safety with mobility and ambulation.  Home occupational therapy is to evaluate and treat for strengthening, ADL needs, adaptive equipment and safety.  Social work consultation is to coordinate cares.    This patient is homebound because: (Please write a brief narrative summary describing the functional limitations as to why this patient is homebound and specifically what makes this patient homebound.)  She continues to have weakness and unsteadiness of gait and requires IV antibiotics    The patient is, or has been, under my care and I have initiated the establishment of the plan of care. This patient will be followed by a physician who will periodically review the plan of care.    Schedule follow up visit with primary care provider within 7 days to reestablish care.    Electronically signed by: Sterling Groves MD

## 2021-06-22 NOTE — PROGRESS NOTES
[facility]    Facility:   [facility] [[id number]]   Code Status: FULL CODE  PCP: Lashonda Sunshine MD   Phone: [phone]   Fax: [phone]      CHIEF COMPLAINT/REASON FOR VISIT:  Chief Complaint   Patient presents with     Discharge Summary       HISTORY COURSE:  Toi is a 53 y.o. female who was hospitalized with back pain and fever to 104.  Symptoms progressed to include right shoulder and arm pain.  MRI scan showed a fluid collection suggestive of epidural abscess extending from C5-C6 7.  She was started on broad-spectrum antibiotics.  Neurosurgery and infectious disease were consulted.  She underwent surgery including C7 and C1 discectomy and fusion and drainage of the abscess.  OTILIA did not show evidence for bowel adhesions.  She will continue with IV antibiotics until 1/21/19 on nafcillin.    Subsequently she was seen by her neurosurgeon for follow-up while she was at the transitional care unit and she was having additional pain.  Further studies showed worsening of epidural abscess and she had further procedures surgically and plans for IV nafcillin for a total of 6 weeks.    Upon current review of systems she is feeling much better.  She is not having fevers or chills.  She is able to walk well compared to previously.  She continues to have pain requiring OxyContin 10 mg every 12 hours and oxycodone 5 mg 1-2 tablets every 4 hours as needed.  She has underlying anxiety for which she uses Klonopin 0.5 mg 1/2-1 tablet daily as needed for panic.  Prescriptions were given for discharge of 10 pills of Klonopin, 20 pills of oxycodone, and 20 pills of OxyContin to cover until she has follow-up with her primary care and specialists.  In other review of systems she is not experiencing chest pain or palpitations of the heart.  She does not have cough or shortness of breath.  She does not have abdominal pain or nausea and bowels are slightly loose and for this reason probiotic was started  at this time with lactobacillus of 15 billion twice daily.  I explained that this should be continued for 2 weeks past the time of antibiotic administration.         She has other medical history of status post gastric bypass, depression, anxiety, vitamin B12 deficiency and intestinal malabsorption.    Review of Systems    Vitals:    01/02/19 1804   BP: 161/86   Pulse: (!) 110   Resp: 20   Temp: 98.2  F (36.8  C)   SpO2: 94%       Physical Exam   Constitutional: No distress.   Eyes: Right eye exhibits no discharge. Left eye exhibits no discharge.   Neck: No JVD present.   Cardiovascular: Normal heart sounds.   Pulmonary/Chest: Breath sounds normal. No respiratory distress.   Abdominal: Soft. Bowel sounds are normal. She exhibits no distension. There is no tenderness.   Neurological: She is alert.   Psychiatric: She has a normal mood and affect.   Nursing note and vitals reviewed.      MEDICATION LIST:  Current Outpatient Medications   Medication Sig     acetaminophen (TYLENOL) 500 MG tablet Take 1,000 mg by mouth 4 (four) times a day.     clonazePAM (KLONOPIN) 0.5 MG tablet Take 0.25-0.5 mg by mouth daily as needed for anxiety (Give 0.25 mg for moderate panic attacks, give 0.5 mg for severe panic attacks).            hydrOXYzine pamoate (VISTARIL) 50 MG capsule Take 50 mg by mouth every 4 (four) hours as needed for itching or anxiety.            methocarbamol (ROBAXIN) 500 MG tablet Take 1,000 mg by mouth 4 (four) times a day.     nafcillin 1 gram/50 mL IVPB Infuse 2 g into a venous catheter every 4 (four) hours.     oxyCODONE (OXYCONTIN) 10 mg 12 hr tablet Take 10 mg by mouth every 12 (twelve) hours.            oxyCODONE (ROXICODONE) 5 MG immediate release tablet Take 5-10 mg by mouth every 4 (four) hours as needed for pain (10 mg for pain rated 6-10/10 and 5 mg for pain rated 1-5/10).            polyethylene glycol (MIRALAX) 17 gram packet Take 17 g by mouth as needed.     senna-docusate (SENNOSIDES-DOCUSATE  SODIUM) 8.6-50 mg tablet Take 3 tablets by mouth 2 (two) times a day.     venlafaxine 225 mg TR24 Take 225 mg by mouth daily.       DISCHARGE DIAGNOSIS:    ICD-10-CM    1. Epidural abscess G06.2    2. Status post cervical spinal fusion Z98.1    3. History of gastric bypass Z98.84    4. Depression, unspecified depression type F32.9    5. Anxiety F41.9        MEDICAL EQUIPMENT NEEDS:  She does require a walker but otherwise does not have specialized durable medical equipment needs    DISCHARGE PLAN/FACE TO FACE:  I certify that services are/were furnished while this patient was under the care of a physician and that a physician or an allowed non-physician practitioner (NPP), had a face-to-face encounter that meets the physician face-to-face encounter requirements. The encounter was in whole, or in part, related to the primary reason for home health. The patient is confined to his/her home and needs intermittent skilled nursing, physical therapy, speech-language pathology, or the continued need for occupational therapy. A plan of care has been established by a physician and is periodically reviewed by a physician.  Date of Face-to-Face Encounter: 1/3/19    I certify that, based on my findings, the following services are medically necessary home health services: Home occupational therapy, home physical therapy, , skilled nursing, home health aide.    My clinical findings support the need for the above skilled services because: (Please write a brief narrative summary that describes what the RN, PT, SLP, or other services will be doing in the home. A list of diagnoses in this section does not meet the CMS requirements.)  Skilled nursing visits are for medication set up and teaching, symptom and disease process monitoring and education.  Home health aide services are for bathing and ADL needs.  Home physical therapy is to evaluate and treat for strengthening, balance, endurance, and safety with mobility and  ambulation.  Home occupational therapy is to evaluate and treat for strengthening, ADL needs, adaptive equipment and safety.  Social work consultation is to coordinate cares.    This patient is homebound because: (Please write a brief narrative summary describing the functional limitations as to why this patient is homebound and specifically what makes this patient homebound.)  She continues to have weakness and unsteadiness of gait and requires IV antibiotics    The patient is, or has been, under my care and I have initiated the establishment of the plan of care. This patient will be followed by a physician who will periodically review the plan of care.    Schedule follow up visit with primary care provider within 7 days to reestablish care.    Electronically signed by: Sterling Groves MD

## 2021-06-22 NOTE — PROGRESS NOTES
[facility]      Facility:    [facility] [[id number]]  Code Status: FULL CODE      Chief Complaint/Reason for Visit:  Chief Complaint   Patient presents with     H & P       HPI:   Toi is a 53 y.o. female who was hospitalized with back pain and fever to 104.  Symptoms progressed to include right shoulder and arm pain.  MRI scan showed a fluid collection suggestive of epidural abscess extending from C5-C6 7.  She was started on broad-spectrum antibiotics.  Neurosurgery and infectious disease were consulted.  She underwent surgery including C7 and C1 discectomy and fusion and drainage of the abscess.  OTILIA did not show evidence for bowel adhesions.  She will continue with IV antibiotics until 19 on nafcillin.    She has other medical history of status post gastric bypass, depression, anxiety, vitamin B12 deficiency and intestinal malabsorption.    Upon current review of systems she is feeling much better with no fevers or chills.  She does not have sore throat.  She was having some headache today.  She was not having cough or shortness of breath.  She was not having chest pain or palpitations of the heart.  No abdominal pain or nausea.  No dysuria.    Past Medical History:  Past Medical History:   Diagnosis Date     Anxiety      Cervical radiculopathy      Depression      Epidural abscess      Intestinal malabsorption      Menopausal state      MSSA bacteremia      Vitamin B12 deficiency            Surgical History:  Past Surgical History:   Procedure Laterality Date     ANTERIOR CERVICAL DISCECTOMY W/ FUSION  2018    C7-T1; drainage of spinal epidural abscess; Dr. Modi       SECTION       GASTRIC BYPASS         Family History:   Family History   Problem Relation Age of Onset     Heart disease Mother      Throat cancer Mother      Depression Mother      COPD Mother      Cancer Father      Anxiety disorder Brother      Depression Brother      Hypertension Brother       Cerebral palsy Other      Anxiety disorder Brother      Depression Brother      Diabetes Brother      Hypertension Brother        Social History:    Social History     Socioeconomic History     Marital status: Single     Spouse name: Not on file     Number of children: Not on file     Years of education: Not on file     Highest education level: Not on file   Social Needs     Financial resource strain: Not on file     Food insecurity - worry: Not on file     Food insecurity - inability: Not on file     Transportation needs - medical: Not on file     Transportation needs - non-medical: Not on file   Occupational History     Not on file   Tobacco Use     Smoking status: Never Smoker     Smokeless tobacco: Never Used   Substance and Sexual Activity     Alcohol use: No     Drug use: No     Sexual activity: Not Currently   Other Topics Concern     Not on file   Social History Narrative     Not on file          Review of Systems   All other systems reviewed and are negative.      Vitals:    12/13/18 1449   BP: 136/90   Pulse: 91   Resp: 14   SpO2: 94%       Physical Exam   Constitutional: No distress.   HENT:   Mouth/Throat: Oropharynx is clear and moist.   Eyes: Right eye exhibits no discharge. Left eye exhibits no discharge.   Neck: No JVD present. No thyromegaly present.   Cardiovascular: Normal rate, regular rhythm and normal heart sounds.   Pulmonary/Chest: Breath sounds normal. No respiratory distress.   Abdominal: Soft. Bowel sounds are normal. She exhibits no distension. There is no tenderness.   Musculoskeletal: She exhibits no edema.   Lymphadenopathy:     She has no cervical adenopathy.   Neurological: She is alert.   Skin: Skin is warm and dry.   Psychiatric: She has a normal mood and affect.   Nursing note and vitals reviewed.      Medication List:  Current Outpatient Medications   Medication Sig     acetaminophen (TYLENOL) 500 MG tablet Take 1,000 mg by mouth 4 (four) times a day.     cefazolin sodium  (CEFAZOLIN IV) Infuse 2 g into a venous catheter every 8 (eight) hours.     clonazePAM (KLONOPIN) 0.5 MG tablet Take 0.25-0.5 mg by mouth daily as needed for anxiety.     hydrOXYzine pamoate (VISTARIL) 50 MG capsule Take 50 mg by mouth every 4 (four) hours as needed for itching.     methocarbamol (ROBAXIN) 500 MG tablet Take 1,000 mg by mouth 4 (four) times a day.     oxyCODONE (OXYCONTIN) 10 mg 12 hr tablet Take 10 mg by mouth 2 (two) times a day.     oxyCODONE (ROXICODONE) 5 MG immediate release tablet Take 5-10 mg by mouth every 4 (four) hours as needed for pain.     polyethylene glycol (MIRALAX) 17 gram packet Take 17 g by mouth as needed.     senna-docusate (SENNOSIDES-DOCUSATE SODIUM) 8.6-50 mg tablet Take 3 tablets by mouth 2 (two) times a day.     venlafaxine 225 mg TR24 Take 225 mg by mouth daily.       Labs:  No new laboratory testing    Assessment:    ICD-10-CM    1. Epidural abscess G06.2    2. Cervical radiculopathy M54.12    3. History of gastric bypass Z98.84    4. Status post cervical spinal fusion Z98.1        Plan:  Continue with IV antibiotics.  Continue with therapies.  Continue to monitor medical conditions.      Electronically signed by: Sterling Groves MD

## 2021-06-22 NOTE — PROGRESS NOTES
[facility]    NAME:  Toi Mg             :  1965  MRN: [id number]  CODE STATUS:  POLST AVAILABLE    FACILITY:  [facility] [[id number]]       ROOM:   202    CHIEF COMPLAINT/REASON FOR VISIT:  Chief Complaint   Patient presents with     Problem Visit     C7-T1 anterior diskectomy and fusion and drainage of spinal epidural abscess.     HISTORY OF PRESENT ILLNESS: Toi Mg is a 53 y.o. female with a PMH significant for Anxiety, Depression, s/p gastric bypass malabsorption, menopausal state, and Vitamin B12 deficiency who was seen on 2018 by PCP for back pain on 2018.  MRI done at the time revealed several herniated discs.  She was given steroids, pain medications, and followed up with neurosurgery.   She later presented tyo the ER as symptoms worsened to include right shoulder and arm pain.   MRI at this time (done with contrast) showed a fluid collection suggestive o epidural abscess extending from C5 to T6-7.  She was admitted, started on broad spectrum antibiotics and seen by Neurosurgery and ID.  Blood cultures grew MSSA without a clear source.  She underwent C7-T1 anterior diskectomy and fusion and drainage of spinal epidural abscess.  She underwent OTILIA which did not show evidence of valve vegetations.  She is to continue antibiotics until 2019.  She was stabilized and transferred to TCU for continued rehabilitation.     Past Medical History:   Diagnosis Date     Anxiety      Cervical radiculopathy      Depression      Epidural abscess      Intestinal malabsorption      Menopausal state      MSSA bacteremia      Vitamin B12 deficiency      Past Surgical History:   Procedure Laterality Date     ANTERIOR CERVICAL DISCECTOMY W/ FUSION  2018    C7-T1; drainage of spinal epidural abscess; Dr. Modi       SECTION       GASTRIC BYPASS       Family History   Problem Relation Age of Onset     Heart disease Mother      Throat cancer  Mother      Depression Mother      COPD Mother      Cancer Father      Anxiety disorder Brother      Depression Brother      Hypertension Brother      Cerebral palsy Other      Anxiety disorder Brother      Depression Brother      Diabetes Brother      Hypertension Brother      Social History     Socioeconomic History     Marital status: Single     Spouse name: Not on file     Number of children: Not on file     Years of education: Not on file     Highest education level: Not on file   Social Needs     Financial resource strain: Not on file     Food insecurity - worry: Not on file     Food insecurity - inability: Not on file     Transportation needs - medical: Not on file     Transportation needs - non-medical: Not on file   Occupational History     Not on file   Tobacco Use     Smoking status: Never Smoker     Smokeless tobacco: Never Used   Substance and Sexual Activity     Alcohol use: No     Drug use: No     Sexual activity: Not Currently   Other Topics Concern     Not on file   Social History Narrative     Not on file     Allergies   Allergen Reactions     Barbiturates      Ethyl Alcohol      Gantrisin Rash     Pyridium [Phenazopyridine] Rash     Current Outpatient Medications   Medication Sig Dispense Refill     acetaminophen (TYLENOL) 500 MG tablet Take 1,000 mg by mouth 4 (four) times a day.       cefazolin sodium (CEFAZOLIN IV) Infuse 2 g into a venous catheter every 8 (eight) hours.       clonazePAM (KLONOPIN) 0.5 MG tablet Take 0.25-0.5 mg by mouth daily as needed for anxiety.       hydrOXYzine pamoate (VISTARIL) 50 MG capsule Take 50 mg by mouth every 4 (four) hours as needed for itching.       methocarbamol (ROBAXIN) 500 MG tablet Take 1,000 mg by mouth 4 (four) times a day.       oxyCODONE (OXYCONTIN) 10 mg 12 hr tablet Take 10 mg by mouth 2 (two) times a day.       oxyCODONE (ROXICODONE) 5 MG immediate release tablet Take 5-10 mg by mouth every 4 (four) hours as needed for pain.       polyethylene  glycol (MIRALAX) 17 gram packet Take 17 g by mouth as needed.       senna-docusate (SENNOSIDES-DOCUSATE SODIUM) 8.6-50 mg tablet Take 3 tablets by mouth 2 (two) times a day.       venlafaxine 225 mg TR24 Take 225 mg by mouth daily.       No current facility-administered medications for this visit.      REVIEW OF SYSTEMS:    Currently, no fever, chills, or rigors. Does not have any visual or hearing problems. Denies any chest pain, headaches, palpitations, lightheadedness, dizziness, shortness of breath, or cough. Appetite is good. Denies any GERD symptoms. Denies any difficulty with swallowing, nausea, or vomiting.  Denies any abdominal pain, diarrhea or constipation. Denies any urinary symptoms. Insomnia. No active bleeding. No rash. Anxiety.  Back pain.    PHYSICAL EXAMINATION:  Vitals:    12/25/18 1348   BP: 138/87   Pulse: 92   Resp: 18   Temp: 98.1  F (36.7  C)   SpO2: 92%   Weight: 199 lb (90.3 kg)       GENERAL: Awake, Alert, oriented x3, not in any form of acute distress, answers questions appropriately, follows simple commands, conversant  HEENT: Head is normocephalic with normal hair distribution. No evidence of trauma. Ears: No acute purulent discharge. Eyes: Conjunctivae pink with no scleral jaundice. Nose: Normal mucosa and septum. NECK: Supple with no cervical or supraclavicular lymphadenopathy. Trachea is midline.   CHEST: No tenderness or deformity, no crepitus  LUNG: Clear to auscultation with good chest expansion. There are no crackles or wheezes, normal AP diameter.  BACK: No kyphosis of the thoracic spine. Symmetric, no curvature, ROM normal, no CVA tenderness, no spinal tenderness   CVS: There is good S1  S2, there are no murmurs, rubs, gallops, or heaves, rhythm is regular,  2+ pulses symmetric in all extremities.  ABDOMEN: Globular and soft, nontender to palpation, non distended, no masses, no organomegaly, good bowel sounds, no rebound or guarding, no peritoneal signs.   EXTREMITIES:  Full  range of motion on both upper and lower extremities, there is no tenderness to palpation, no pedal edema, no cyanosis or clubbing, no calf tenderness.  Pulses equal in all extremities, normal cap refill, no joint swelling.  SKIN: Warm and dry, no erythema noted.  Skin color, texture, no rashes or lesions.  NEUROLOGICAL: The patient is oriented to person, place and time. Strength and sensation are grossly intact. Face is symmetric.    LABS:      Lab Results   Component Value Date    WBC 6.3 12/17/2018    HGB 9.5 (L) 12/17/2018    HCT 30.8 (L) 12/17/2018     12/17/2018     (H) 12/17/2018     ASSESSMENT/PLAN:    1. Epidural abscess - Continue antibiotics until 1/21/2019 via PICC line. Followed by ID and Neurosurgery.  Continue PT/OT and current pain medication regimen   2. Status post cervical spinal fusion - see above   3. History of gastric bypass - Stable   4. Anemia, unspecified type - Will start Ferrous sulfate   5. Anxiety - Continue prn Klonopin   6. Depression, unspecified depression type - Continue Effexor   7. Constipation - Start Miralax daily   8. Insomnia - Start Melatonin at bedtime         Electronically signed by:  Cecil Bishop CNP    35 minutes TT of which 50% was spent in counseling and coordination of care of the above plan.    Time spent in interview and examination of patient, review of available records, and discussion with nursing staff. Continue care plan, efforts at therapy, and monitor nutritional status.

## 2021-06-22 NOTE — PROGRESS NOTES
Code Status:  FULL CODE  Visit Type: Follow Up     Facility:  [facility] [[id number]]        Facility Type: SNF (Skilled Nursing Facility, TCU)    History of Present Illness: Toi Mg is a 53 y.o. female seen today for TCU follow-up.  She had a recent hospitalization from 12/20 to 12/24/18 due to increasing pain and discomfort from her epidural abscesses in which she was already on IV cefazolin.  She was recently admitted to the TCU 12/13 following newfound epidural abscesses which extended from C5-C6 and 7.  She was put on ceftezole and and she had a C7 NC 1 discectomy and fusion along with drainage of abscess.  She was also found to have endocarditis with vegetations on her valves on OTILIA.  During all recent auscultation her antibiotics were switched from IV ceftezole and to IV nafcillin.  Blood drawn today showed her hemoglobin dropped 8.8 where at [facility] on 12/23 it was 9.1.  Her CRP was up to 7.6 whereas on 12/17 it was 7.1.  Patient does state that she has an extensive history of B12 deficiency and used to get B12 injections up to about 8 years ago.  Her pain and anxiety are a big barrier to progression with therapy but she hopes to be discharged to home with services next week.  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  Today she complains more of low back discomfort in which this could potentially be chronic or overcompensation due to cervical pain.  We had a long discussion in regards to her diabetes/panic disorder and she has lots of stressors which have increased the symptoms recently.  She does follow a psychiatrist at [facility] but has not yet to set up for psychology.  She states she is planning on getting a support animal which the dog should be here in March.    Review of Systems   Patient denies fever, chills, headache, lightheadedness, dizziness, rhinorrhea, cough, congestion, shortness of breath, chest pain, palpitations,  abdominal pain, n/v, diarrhea, constipation, change in appetite, dysuria, frequency, burning or pain with urination.  Otherwise review of systems are negative.       Physical Exam   Vital signs: /86, pulse 85, respiratory 16, 98.6 temp, O2 95% on room air, weight stable at 190.4 pounds  GENERAL APPEARANCE: Well developed, well nourished, in no acute distress.  HEENT: normocephalic, atraumatic  PERRL, sclerae anicteric, conjunctivae pink and moist, EOM intact  NECK: Supple and symmetric. Trachea is midline, no thyromegaly, no adenopathy, and no tenderness.incision is clean dry and intact on front of neck.    LUNGS: Lung sounds CTA, no adventitious sounds, respiratory effort normal.  CARD: RRR, S1, S2, without murmurs, gallops, rubs,  ABD: Soft and nontender with normal bowel sounds.   MSK: Muscle strength and tone were normal.  EXTREMITIES: No cyanosis, clubbing or edema.  NEURO: Alert and oriented x 3. Normal affect.  Equal sensation bilaterally to upper and lower extremities.  SKIN: Inspection of the skin reveals no rashes, ulcerations or petechiae.  PSYCH: Weepy, voices increased to stress          Labs:    Recent Results (from the past 240 hour(s))   C-Reactive Protein (CRP)   Result Value Ref Range    CRP 7.6 (H) 0.0 - 0.8 mg/dL   HM1 (CBC with Diff)   Result Value Ref Range    WBC 5.0 4.0 - 11.0 thou/uL    RBC 2.85 (L) 3.80 - 5.40 mill/uL    Hemoglobin 8.8 (L) 12.0 - 16.0 g/dL    Hematocrit 27.9 (L) 35.0 - 47.0 %    MCV 98 80 - 100 fL    MCH 30.9 27.0 - 34.0 pg    MCHC 31.5 (L) 32.0 - 36.0 g/dL    RDW 13.9 11.0 - 14.5 %    Platelets 373 140 - 440 thou/uL    MPV 9.0 8.5 - 12.5 fL    Neutrophils % 55 50 - 70 %    Lymphocytes % 28 20 - 40 %    Monocytes % 12 (H) 2 - 10 %    Eosinophils % 5 0 - 6 %    Basophils % 1 0 - 2 %    Neutrophils Absolute 2.7 2.0 - 7.7 thou/uL    Lymphocytes Absolute 1.4 0.8 - 4.4 thou/uL    Monocytes Absolute 0.6 0.0 - 0.9 thou/uL    Eosinophils Absolute 0.2 0.0 - 0.4 thou/uL     Basophils Absolute 0.0 0.0 - 0.2 thou/uL         Assessment:  1. Epidural abscess     2. Status post cervical spinal fusion     3. Anemia, unspecified type     4. History of gastric bypass     5. Depression, unspecified depression type         Plan:   Epidural abscess: Continue on nafcillin until 1/21/2017 with follow-up with infectious disease.  She will have an MRI and surgery follow-up in 2 weeks.  We will recheck CBC with differential and CRP next week.  Pain is managed with OxyContin/oxycodone/Vistaril/methocarbamol  Anemia: Hemoglobin continues to drop very likely could be a vitamin B12 deficiency due to history we will start her on Nascobal nasal spray as I do not want to initiate B12 injections without further study and her gastric bypass makes it difficult for absorption.  We will continue to monitor  Depression: Encouraged her to set up psychology appointment as soon as she could in order to establish a support system.  Continue on venlafaxine, and Klonopin.   Lumbago: Patient states ice and heat to low back is intolerable.  We will have PT to TENS treatments          Electronically signed by: Yue Reese, CNP

## 2021-06-22 NOTE — PROGRESS NOTES
[facility]        Visit Type: H & P (Cervical spinal abscess)    Code Status:  FULL CODE  Facility:  [facility] [[id number]]          PCP: Lashonda Sunshine MD       PHONE: [phone]     FAX:[phone]        ASSESSMENT/PLAN:  1. Spinal epidural abscess   status post ACDF at C7-T1 and C5-C6.  Continue nafcillin IV times 6 weeks.  Follow up at neurosurgery clinic.  Continue PT/OT.  CRP on 12/23 is high at 14.35, will recheck CRP levels.  Follow-up with Dr. Briceño at ID clinic in 2 weeks   2. Depression, unspecified depression type   a long discussion was done with the patient regarding her depression with anxiety.  She is currently on venlafaxine and as needed clonazepam   3. History of gastric bypass   likely contributing to her anemia and B12 deficiency.  She is currently not on any Vit D supplement.  Will recheck vitamin D levels, will need replacement if low   4. Anemia, unspecified type   most recent hemoglobin is at 9.1, again recent surgery and history of gastric bypass with malabsorption likely contributing.  We will recheck hemogram         HISTORY OF PRESENT ILLNESS:   Toi Mg is a 53 y.o. female with a history of gastric bypass with intestinal malabsorption and B12 deficiency, chronic anemia, depression with anxiety who was initially admitted for cervical thoracic spinal epidural abscess which started when she was seen by her PCP on 12/1 for back pain with an MRI without contrast showing several herniated disks.  She then went to the emergency room because of worsening of her back pain with associated right shoulder and arm pain.  An MRI with contrast showed fluid collection suggestive of spinal epidural abscess at level C5- T6-7.  She was then admitted, given IV antibiotics and was seen by surgery/ID.  Blood cultures showed MSSA.  IV Cefazolin changed to nafcillin.  On 12/7, she underwent C7-T1 anterior discectomy/fusion/drainage of  spinal epidural abscess.  A OTILIA was negative.  On  she was seen at neurosurgery clinic still complaining of back pain and worsening neck pain.  An MRI showed discitis and osteomyelitis at C5-C6 level with SEA extending behind C6 body with cord compression.  She underwent C5-C6 ACDF procedure with plans of continuing IV nafcillin for a total of 6 weeks.    Currently, the patient states that she is doing much better with less swelling around surgical site on the anterior part of her neck although she states that there is minimal pain especially with movement of her head.  She denies any back pain, shoulder or arm pain.  She also denies any extremity weakness or tingling.  She states that she feels frustrated that this happened to her and that she is anxious because of recurrence and extension of her abscess.  She denies any fevers or chills, chest pains or shortness of breath.  She complains of occasional headaches but she states this is because of her migraine.  She denies any visual changes, nausea or vomiting.  She does not have any abdominal pain, bowel or urinary problems.  She does have any dizziness or lightheadedness.  She is eating well.    Other review of systems are negative.      PAST MEDICAL/SURGICAL HISTORY:  Past Medical History:   Diagnosis Date     Anxiety      Cervical radiculopathy      Depression      Epidural abscess      Intestinal malabsorption      Menopausal state      Motion sickness      MSSA bacteremia      PONV (postoperative nausea and vomiting)      Vitamin B12 deficiency      Past Surgical History:   Procedure Laterality Date     ANTERIOR CERVICAL DISCECTOMY W/ FUSION  2018    C7-T1; drainage of spinal epidural abscess; Dr. Modi       SECTION       GASTRIC BYPASS         SOCIAL HISTORY:  Social History     Socioeconomic History     Marital status: Single     Spouse name: Not on file     Number of children: Not on file     Years of education: Not on file     Highest  education level: Not on file   Social Needs     Financial resource strain: Not on file     Food insecurity - worry: Not on file     Food insecurity - inability: Not on file     Transportation needs - medical: Not on file     Transportation needs - non-medical: Not on file   Occupational History     Not on file   Tobacco Use     Smoking status: Never Smoker     Smokeless tobacco: Never Used   Substance and Sexual Activity     Alcohol use: No     Drug use: No     Sexual activity: Not Currently   Other Topics Concern     Not on file   Social History Narrative     Not on file       FAMILY HISTORY:  Family History   Problem Relation Age of Onset     Heart disease Mother      Throat cancer Mother      Depression Mother      COPD Mother      Cancer Mother      Cancer Father      Anxiety disorder Brother      Depression Brother      Hypertension Brother      Cerebral palsy Other      Anxiety disorder Brother      Depression Brother      Diabetes Brother      Hypertension Brother      Cerebral palsy Son      Depression Son        MEDICATIONS:  Current Outpatient Medications on File Prior to Visit   Medication Sig     acetaminophen (TYLENOL) 500 MG tablet Take 1,000 mg by mouth 4 (four) times a day.     clonazePAM (KLONOPIN) 0.5 MG tablet Take 0.25-0.5 mg by mouth daily as needed for anxiety (Give 0.25 mg for moderate panic attacks, give 0.5 mg for severe panic attacks).            hydrOXYzine pamoate (VISTARIL) 50 MG capsule Take 50 mg by mouth every 4 (four) hours as needed for itching or anxiety.            methocarbamol (ROBAXIN) 500 MG tablet Take 1,000 mg by mouth 4 (four) times a day.     nafcillin 1 gram/50 mL IVPB Infuse 2 g into a venous catheter every 4 (four) hours.     oxyCODONE (OXYCONTIN) 10 mg 12 hr tablet Take 10 mg by mouth every 12 (twelve) hours.            oxyCODONE (ROXICODONE) 5 MG immediate release tablet Take 5-10 mg by mouth every 4 (four) hours as needed for pain (10 mg for pain rated 6-10/10 and 5  mg for pain rated 1-5/10).            polyethylene glycol (MIRALAX) 17 gram packet Take 17 g by mouth as needed.     senna-docusate (SENNOSIDES-DOCUSATE SODIUM) 8.6-50 mg tablet Take 3 tablets by mouth 2 (two) times a day.     venlafaxine 225 mg TR24 Take 225 mg by mouth daily.     Vit B12 NS once a week on Fridays        ALLERGIES:  Allergies   Allergen Reactions     Barbiturates      Ethyl Alcohol      Gantrisin Rash     Pyridium [Phenazopyridine] Rash         PHYSICAL EXAMINATION:  Vital signs 163/86 with previous at 123/66, 98.4, 97, 20, 95% room air  General: Awake, Alert, oriented x3, not in any form of acute distress, answers questions appropriately, follows simple commands, conversant with sad affect, overweight  HEENT: Pink conjunctiva, anicteric sclerae, oral mucosa is moist  NECK: Supple, without any lymphadenopathy, thyromegaly or any masses, anterior neck with horizontal surgical site that is clean, mildly bruised around the site, but no warmth, redness, has minimal swelling  LUNG: Clear to auscultation, good chest expansion. There are no crackles, no wheezes, normal AP diameter  BACK: No kyphosis of the thoracic spine  CVS: There is good S1  S2, there are no murmurs, no heaves, rhythm is regular  ABDOMEN: Globular and soft, nontender to palpation, no organomegaly, good bowel sounds  EXTREMITIES: Good range of motion on both upper and lower extremities, trace bilateral pedal edema, no cyanosis or clubbing, no calf tenderness  SKIN: Warm and dry, no rashes noted    LABS:  Lab Results   Component Value Date    WBC 5.0 12/27/2018    HGB 8.8 (L) 12/27/2018    HCT 27.9 (L) 12/27/2018    MCV 98 12/27/2018     12/27/2018     Lab Results   Component Value Date    CREATININE 0.57 (L) 12/17/2018           >45 minutes of total time spent, greater than 55% of the time spent in coordination of care and counseling regarding the above medical issues and plan of care. I have reviewed the patient's medical  records, labs and medications.       Electronically signed by:Luly Rosas MD

## 2021-12-02 NOTE — TELEPHONE ENCOUNTER
SUMAtriptan (IMITREX) 50 MG tablet      Last Written Prescription Date: 6/15/16  Last Fill Quantity: 18, # refills: 0  Last Office Visit with [facility], [facility] or [facility] prescribing provider: 6/13/17       BP Readings from Last 3 Encounters:   06/13/17 122/80   02/01/17 132/84   11/18/16 137/82        Detail Level: Simple

## 2023-10-29 NOTE — TELEPHONE ENCOUNTER
Called and spoke with Toi. She had this faxed to the main fax number. Awaiting the form. Rose Montero,    Attending with